# Patient Record
Sex: FEMALE | Race: WHITE | NOT HISPANIC OR LATINO | Employment: FULL TIME | URBAN - METROPOLITAN AREA
[De-identification: names, ages, dates, MRNs, and addresses within clinical notes are randomized per-mention and may not be internally consistent; named-entity substitution may affect disease eponyms.]

---

## 2019-07-10 ENCOUNTER — HOSPITAL ENCOUNTER (OUTPATIENT)
Dept: ULTRASOUND IMAGING | Facility: HOSPITAL | Age: 29
Discharge: HOME | End: 2019-07-10
Attending: OBSTETRICS & GYNECOLOGY

## 2020-01-17 ENCOUNTER — HOSPITAL ENCOUNTER (OUTPATIENT)
Dept: LABOR AND DELIVERY | Facility: HOSPITAL | Age: 30
Discharge: HOME | End: 2020-01-17
Attending: OBSTETRICS & GYNECOLOGY

## 2020-02-13 ENCOUNTER — HOSPITAL ENCOUNTER (OUTPATIENT)
Dept: LABOR AND DELIVERY | Facility: HOSPITAL | Age: 30
Discharge: HOME | End: 2020-02-13
Attending: OBSTETRICS & GYNECOLOGY

## 2020-05-22 ENCOUNTER — HOSPITAL ENCOUNTER (OUTPATIENT)
Dept: GENERAL RADIOLOGY | Facility: HOSPITAL | Age: 30
Discharge: HOME | End: 2020-05-22
Attending: NURSE PRACTITIONER

## 2020-05-22 ENCOUNTER — CONVERSION ENCOUNTER (OUTPATIENT)
Dept: FAMILY MEDICINE CLINIC | Facility: CLINIC | Age: 30
End: 2020-05-22

## 2020-05-22 ENCOUNTER — OFFICE VISIT CONVERTED (OUTPATIENT)
Dept: FAMILY MEDICINE CLINIC | Facility: CLINIC | Age: 30
End: 2020-05-22
Attending: NURSE PRACTITIONER

## 2021-05-10 NOTE — H&P
History and Physical      Patient Name: Gisselle White   Patient ID: [id number]   Sex: Female   YOB: 1990    Primary Care Provider: Mindy RIVERA    Visit Date: May 22, 2020    Provider: MIGUEL Thomas   Location: [facility]   Location Address: [address] [place] [address], [address] [place] [address]  [id number]   Location Phone: [phone]          Chief Complaint  · Est Care  · back pain since childbirth 2/16/20. Getting worse. Mid to low pain and consistent.  · wants IUD placed. Needs  referral to OB/GYN      History Of Present Illness  Gisselle White is a 29 year old female who presents for evaluation and treatment of: here to hospitals care. pt having lumbar spine pain which seems to be getting worse since having a baby in feb 2020. denies any radiating pain down the legs. She's had 4 pregnancies and does have no abdominal muscles she reports.       Past Medical History  Disease Name Date Onset Notes   Hemorrhoids --  --    Hernia --  --          Allergy List  Allergen Name Date Reaction Notes   NO KNOWN DRUG ALLERGIES --  --  --          Family Medical History  Disease Name Relative/Age Notes   Family history of breast cancer Mother/   --    Family history of heart disease  grandparents   Family history of diabetes mellitus Mother/   --          Social History  Finding Status Start/Stop Quantity Notes   Tobacco Never --/-- --  --          Review of Systems  · Constitutional  o Admits  o : she has 4 small children so she does feel tired. she does breast feed  o Denies  o : night sweats  · Eyes  o Denies  o : double vision, blurred vision  · HENT  o Denies  o : vertigo, recent head injury  · Breasts  o Denies  o : abnormal changes in breast size, additional breast symptoms except as noted in the HPI  · Cardiovascular  o Denies  o : chest pain, irregular heart beats  · Respiratory  o Denies  o : shortness of breath, productive cough  · Gastrointestinal  o Admits  o : pt  "says she's had abdominal muscle probs with all her pregnancies but this time it seems to be bothering her more. she does try turning self over instead of raising up. is careful about any exercises. She does not have bowel movements regularly but she does have them  o Denies  o : nausea, vomiting, diarrhea  · Genitourinary  o Admits  o : she wanted to be checked for any urinary problems due to her back pain   o Denies  o : dysuria, urinary retention  · Integument  o Denies  o : hair growth change, new skin lesions  · Neurologic  o Denies  o : altered mental status, seizures  · Musculoskeletal  o Admits  o : lumbar spine pain is constant dull ache. she had pain in upper back too but sometimes seems to go to the spine. she can feel pain when she is bending over to do things which makes it hard with having small children  o Denies  o : joint swelling  · Endocrine  o Denies  o : cold intolerance, heat intolerance  · Psychiatric  o Denies  o : anxiety, depression, difficulty sleeping, excessive anger  · Heme-Lymph  o Denies  o : petechiae, lymph node enlargement or tenderness  · Allergic-Immunologic  o Denies  o : frequent illnesses      Vitals  Date Time BP Position Site L\R Cuff Size HR RR TEMP (F) WT  HT  BMI kg/m2 BSA m2 O2 Sat HC       05/22/2020 10:36 /67 Sitting    69 - R 11 97.3 177lbs 9oz 5'  6\" 28.66 1.94 99 %          Physical Examination  · Constitutional  o Appearance  o : well-nourished, well developed, alert, in no acute distress  · Head and Face  o Face  o :   § Inspection  § : no facial lesions  § Palpation  § : no sinus tenderness on palpation  · Eyes  o Conjunctivae  o : conjunctivae normal  o Sclerae  o : sclerae white  o Pupils and Irises  o : pupils equal, round, and reactive to light and accommodation bilaterally  o Corneas  o : tear film normal, no lesions present  o Eyelids/Ocular Adnexae  o : eyelid appearance normal, no exudates present, eye moisture level normal  · Ears, Nose, Mouth and " Throat  o Ears  o : external ear auricle normal, otic canal normal, TM with no reddness, effusion, retraction  o Nose  o : external normal, nasal mucosa normal, turbinates normal  o Oral Cavity  o : tongue no lesion, oral mucosa normal  o Throat  o : no erythemia, exudate or lesions  · Neck  o Inspection/Palpation  o : normal appearance, no masses or tenderness, trachea midline, no enlarged cervical or supraclavicular lymphnodes palpated  o Thyroid  o : gland size normal, nontender, no nodules or masses present on palpation, thyroid motion normal during swallowing  · Respiratory  o Respiratory Effort  o : breathing unlabored  o Inspection of Chest  o : normal appearance, no retractions  o Auscultation of Lungs  o : normal breath sounds throughout  · Cardiovascular  o Heart  o :   § Auscultation of Heart  § : regular rate and rhythm without murmur  o Peripheral Vascular System  o :   § Carotid Arteries  § : normal pulses bilaterally, no bruits present  § Extremities  § : no edema, no cyanosis, no distal hair loss, normal capillary refill  · Gastrointestinal  o Abdominal Examination  o : abdomen nontender to palpation, hypoactive bowel sounds present, tone normal without rigidity or guarding, no masses present, abdomen scaphoid upon supine  · Musculoskeletal  o General  o :   § General Musculoskeletal  § : No joint swelling or deformity., Muscle tone, strength  o Spine  o :   § Inspection/Palpation  § : No palpable pain  · Skin and Subcutaneous Tissue  o General Inspection  o : no rashes or lesions present, no areas of discoloration  · Neurologic  o Mental Status Examination  o : judgement, insight intact, modd and affect appropriate  o Motor Examination  o : strength grossly intact in all four extremities  o Gait and Station  o : normal gait, able to stand without difficulty          Results  · In-Office Procedures  o Lab procedure  § IOP - Urinalysis without Microscopy (Clinitek) [facility] ([id number])   § Color Ur: Yellow    § Clarity Ur: Clear   § Glucose Ur Ql Strip: Negative   § Bilirub Ur Ql Strip: Negative   § Ketones Ur Ql Strip: Negative   § Sp Gr Ur Qn: greater than 1.030   § Hgb Ur Ql Strip: Negative   § pH Ur-LsCnc: 6.0   § Prot Ur Ql Strip: Negative   § Urobilinogen Ur Strip-mCnc: 0.2 E.U./dL   § Nitrite Ur Ql Strip: Negative   § WBC Est Ur Ql Strip: Negative       Assessment  · Screening for depression     V79.0/Z13.89  · Screening for lipid disorders     V77.91/Z13.220  · Fatigue     780.79/R53.83  · Vitamin D deficiency     268.9/E55.9  · Back pain     724.5/M54.9  · Screening for diabetes mellitus     V77.1/Z13.1  · Screening for cardiovascular condition     V81.2/Z13.6  · Screening for thyroid disorder     V77.0/Z13.29  · Birth control counseling     V25.09/Z30.09  · Diastasis recti     728.84/M62.08      Plan  · Orders  o Hgb A1c [facility] ([id number]) - V77.1/Z13.1 - 05/22/2020  o Physical, Primary Care Panel (CBC, CMP, Lipid, TSH) [facility] ([id number], [id number], [id number], [id number]) - V77.91/Z13.220, V81.2/Z13.6, V77.0/Z13.29, 780.79/R53.83 - 05/22/2020  o Vitamin D (25-Hydroxy) Level ([id number]) - 268.9/E55.9 - 05/22/2020  o ACO-39: Current medications updated and reviewed () - - 05/22/2020  o ACO-18: Negative screen for clinical depression using a standardized tool () - - 05/22/2020  o ACO-14: Influenza immunization was not administered for reasons documented () - - 05/22/2020   personal preference  o Xray thoracic spine 3 views [facility] ([id number]) - 724.5/M54.9 - 05/22/2020   mid to low back pain  o Lumbar Spine Complete [facility] ([id number]) - 724.5/M54.9 - 05/22/2020   mid to low back pain   has pain that starts in thoracic spine and then down into the lumbar spine. no radiation down the legs. has very weak abd muscles  o OB/GYN CONSULTATION (OBGYN) - V25.09/Z30.09 - 05/23/2020   wants IUD placed  o B12 Folate levels (B12FO) - 780.79/R53.83 - 05/22/2020  o General Surgery Consult (GNSUR) - 728.84/M62.08 - 05/22/2020    refer to Dr. Parnell. she's had this for awhile and since 4th pregnancy feels it is now causing her to have more lumbar spine pain  · Medications  o Medications have been Reconciled  o Transition of Care or Provider Policy  · Instructions  o Depression Screen completed and scanned into the EMR under the designated folder within the patient's documents.  o Today's PHQ-9 result is _3__  o Patient was educated/instructed on their diagnosis, treatment and medications prior to discharge from the clinic today.  o Flu vaccine declined.  · Disposition  o Follow up in 6 months            Electronically Signed by: Mindy Sylvester APRN -Author on May 22, 2020 01:10:05 PM

## 2021-05-15 VITALS
WEIGHT: 177.56 LBS | SYSTOLIC BLOOD PRESSURE: 120 MMHG | HEIGHT: 66 IN | RESPIRATION RATE: 11 BRPM | TEMPERATURE: 97.3 F | HEART RATE: 69 BPM | DIASTOLIC BLOOD PRESSURE: 67 MMHG | BODY MASS INDEX: 28.54 KG/M2 | OXYGEN SATURATION: 99 %

## 2021-07-22 ENCOUNTER — OFFICE VISIT (OUTPATIENT)
Dept: FAMILY MEDICINE CLINIC | Facility: CLINIC | Age: 31
End: 2021-07-22

## 2021-07-22 VITALS
WEIGHT: 137.2 LBS | DIASTOLIC BLOOD PRESSURE: 73 MMHG | RESPIRATION RATE: 16 BRPM | SYSTOLIC BLOOD PRESSURE: 117 MMHG | OXYGEN SATURATION: 100 % | BODY MASS INDEX: 22.05 KG/M2 | HEIGHT: 66 IN | HEART RATE: 67 BPM

## 2021-07-22 DIAGNOSIS — E55.9 VITAMIN D DEFICIENCY: ICD-10-CM

## 2021-07-22 DIAGNOSIS — R23.3 EASY BRUISING: ICD-10-CM

## 2021-07-22 DIAGNOSIS — R53.83 FATIGUE, UNSPECIFIED TYPE: ICD-10-CM

## 2021-07-22 DIAGNOSIS — Z13.220 SCREENING FOR LIPID DISORDERS: ICD-10-CM

## 2021-07-22 DIAGNOSIS — R19.09 BULGE IN GROIN AREA: Primary | ICD-10-CM

## 2021-07-22 PROCEDURE — 99203 OFFICE O/P NEW LOW 30 MIN: CPT | Performed by: NURSE PRACTITIONER

## 2021-07-22 NOTE — PROGRESS NOTES
"Chief Complaint  Groin Swelling    Subjective          Gisselle White presents to [facility] for   History of Present Illness    Patient is here to establish care. Patient's previous PCP was Mindy RIVERA.    Patient is here with complaints of bruising more frequently. States frequently bruising on her thighs (inner) with no explanation. denies any bleeding. States this has been going on for several weeks.      Patient is complaining of a swollen spot at the top of her pubic area on the right side that has been there for 2-3 months. Denies any pain or tenderness     Pt is s/p 4 pregnancies. Denies ever having had any issues with pelvic varicosities, states she did have an umbilical hernia during one pregnancy.     Patient is requesting lab work to be done. States she does have a lot of fatigue, but also has 4 kids, just wants to be sure that everything is ok. Hx vit d deficiency   Medical History  Past Medical History:   Diagnosis Date   • Condition not found     HERNIA   • Hemorrhoids      Surgical History  History reviewed. No pertinent surgical history.  Social History  Social History     Socioeconomic History   • Marital status:      Spouse name: Not on file   • Number of children: Not on file   • Years of education: Not on file   • Highest education level: Not on file   Tobacco Use   • Smoking status: Never Smoker   • Smokeless tobacco: Never Used   Vaping Use   • Vaping Use: Never used   Substance and Sexual Activity   • Alcohol use: Not Currently   • Drug use: Never   • Sexual activity: Defer     No current outpatient medications on file.    Review of Systems     Objective     /73 (BP Location: Left arm, Patient Position: Sitting, Cuff Size: Adult)   Pulse 67   Resp 16   Ht 167.6 cm (66\")   Wt 62.2 kg (137 lb 3.2 oz)   SpO2 100%   BMI 22.14 kg/m²     Body mass index is 22.14 kg/m².    Physical Exam  Vitals reviewed.   Constitutional:       " Appearance: Normal appearance. She is well-developed.   HENT:      Head: Normocephalic and atraumatic.      Right Ear: External ear normal.      Left Ear: External ear normal.      Mouth/Throat:      Pharynx: No oropharyngeal exudate.   Eyes:      Conjunctiva/sclera: Conjunctivae normal.      Pupils: Pupils are equal, round, and reactive to light.   Cardiovascular:      Rate and Rhythm: Normal rate and regular rhythm.      Heart sounds: No murmur heard.   No friction rub. No gallop.    Pulmonary:      Effort: Pulmonary effort is normal.      Breath sounds: Normal breath sounds. No wheezing or rhonchi.   Abdominal:      General: Bowel sounds are normal. There is no distension.      Palpations: Abdomen is soft.      Tenderness: There is no abdominal tenderness.      Comments: Right side groin with approximately 3 cm x 3 cm area of bulge noted, nontender, not appreciable when patient is lying flat.   Skin:     General: Skin is warm and dry.   Neurological:      Mental Status: She is alert and oriented to person, place, and time.      Cranial Nerves: No cranial nerve deficit.   Psychiatric:         Mood and Affect: Mood and affect normal.         Behavior: Behavior normal.         Thought Content: Thought content normal.         Judgment: Judgment normal.         Result Review :     The following data was reviewed by: MIGUEL Welsh on 07/22/2021:                         Assessment:  Diagnoses and all orders for this visit:    1. Bulge in groin area (Primary)  -     US Abdomen Limited; Future    2. Vitamin D deficiency  -     Vitamin D 25 Hydroxy    3. Screening for lipid disorders  -     Lipid Panel    4. Fatigue, unspecified type  -     CBC & Differential; Future  -     Comprehensive Metabolic Panel; Future  -     TSH; Future  -     T4, Free; Future  -     Vitamin B12; Future  -     Folate; Future  -     Iron Profile; Future  -     Ferritin; Future  -     APTT; Future    5. Easy bruising  -     APTT;  Future        Plan:   Labs as above.  Ultrasound to evaluate groin bulge          Follow Up     No follow-ups on file.    Patient was given instructions and counseling regarding her condition or for health maintenance advice. Please see specific information pulled into the AVS if appropriate.     Elaine Young, APRN  07/22/2021

## 2021-07-31 PROCEDURE — 87660 TRICHOMONAS VAGIN DIR PROBE: CPT | Performed by: FAMILY MEDICINE

## 2021-07-31 PROCEDURE — 87591 N.GONORRHOEAE DNA AMP PROB: CPT | Performed by: FAMILY MEDICINE

## 2021-07-31 PROCEDURE — 87491 CHLMYD TRACH DNA AMP PROBE: CPT | Performed by: FAMILY MEDICINE

## 2021-07-31 PROCEDURE — 87480 CANDIDA DNA DIR PROBE: CPT | Performed by: FAMILY MEDICINE

## 2021-07-31 PROCEDURE — 87077 CULTURE AEROBIC IDENTIFY: CPT | Performed by: FAMILY MEDICINE

## 2021-07-31 PROCEDURE — 87086 URINE CULTURE/COLONY COUNT: CPT | Performed by: FAMILY MEDICINE

## 2021-07-31 PROCEDURE — 87510 GARDNER VAG DNA DIR PROBE: CPT | Performed by: FAMILY MEDICINE

## 2021-07-31 PROCEDURE — 87186 SC STD MICRODIL/AGAR DIL: CPT | Performed by: FAMILY MEDICINE

## 2021-08-01 ENCOUNTER — TELEPHONE (OUTPATIENT)
Dept: URGENT CARE | Facility: CLINIC | Age: 31
End: 2021-08-01

## 2021-08-01 NOTE — TELEPHONE ENCOUNTER
----- Message from Linda Lindsey MD sent at 8/1/2021  2:25 PM EDT -----  Please call the patient regarding her negative vaginal screen and GC Chlamydia tests.  Let her know her urine culture is still pending for bacterial evaluation.

## 2021-08-02 ENCOUNTER — TELEPHONE (OUTPATIENT)
Dept: URGENT CARE | Facility: CLINIC | Age: 31
End: 2021-08-02

## 2021-08-02 NOTE — TELEPHONE ENCOUNTER
----- Message from MIGUEL Yañez sent at 8/2/2021  3:48 PM EDT -----  Please call the patient regarding her abnormal result.  Please let patient know that her urine culture grew E. coli which is one of the more common organisms we see with urinary tract infections.  It also shows that the medication that she was put on which is the nitrofurantoin should work completely clearing this you to urinary tract infection.  If the patient has any other concerns they may follow-up with her primary care provider.    Thank you,  MIGUEL Yañez

## 2021-08-10 ENCOUNTER — LAB (OUTPATIENT)
Dept: LAB | Facility: HOSPITAL | Age: 31
End: 2021-08-10

## 2021-08-10 ENCOUNTER — HOSPITAL ENCOUNTER (OUTPATIENT)
Dept: ULTRASOUND IMAGING | Facility: HOSPITAL | Age: 31
Discharge: HOME | End: 2021-08-10

## 2021-08-10 DIAGNOSIS — R19.09 BULGE IN GROIN AREA: ICD-10-CM

## 2021-08-10 DIAGNOSIS — R23.3 EASY BRUISING: ICD-10-CM

## 2021-08-10 DIAGNOSIS — R53.83 FATIGUE, UNSPECIFIED TYPE: ICD-10-CM

## 2021-08-10 LAB
25(OH)D3 SERPL-MCNC: 30.5 NG/ML (ref 30–100)
ALBUMIN SERPL-MCNC: 4.3 G/DL (ref 3.5–5.2)
ALBUMIN/GLOB SERPL: 1.8 G/DL
ALP SERPL-CCNC: 35 U/L (ref 39–117)
ALT SERPL W P-5'-P-CCNC: 9 U/L (ref 1–33)
ANION GAP SERPL CALCULATED.3IONS-SCNC: 9 MMOL/L (ref 5–15)
APTT PPP: 24.7 SECONDS (ref 22.2–34.2)
AST SERPL-CCNC: 14 U/L (ref 1–32)
BASOPHILS # BLD AUTO: 0.03 10*3/MM3 (ref 0–0.2)
BASOPHILS NFR BLD AUTO: 0.5 % (ref 0–1.5)
BILIRUB SERPL-MCNC: 0.3 MG/DL (ref 0–1.2)
BUN SERPL-MCNC: 11 MG/DL (ref 6–20)
BUN/CREAT SERPL: 14.7 (ref 7–25)
CALCIUM SPEC-SCNC: 8.9 MG/DL (ref 8.6–10.5)
CHLORIDE SERPL-SCNC: 108 MMOL/L (ref 98–107)
CHOLEST SERPL-MCNC: 160 MG/DL (ref 0–200)
CO2 SERPL-SCNC: 26 MMOL/L (ref 22–29)
CREAT SERPL-MCNC: 0.75 MG/DL (ref 0.57–1)
DEPRECATED RDW RBC AUTO: 41.7 FL (ref 37–54)
EOSINOPHIL # BLD AUTO: 0.21 10*3/MM3 (ref 0–0.4)
EOSINOPHIL NFR BLD AUTO: 3.4 % (ref 0.3–6.2)
ERYTHROCYTE [DISTWIDTH] IN BLOOD BY AUTOMATED COUNT: 12.8 % (ref 12.3–15.4)
FERRITIN SERPL-MCNC: 15.4 NG/ML (ref 13–150)
FOLATE SERPL-MCNC: 3.95 NG/ML (ref 4.78–24.2)
GFR SERPL CREATININE-BSD FRML MDRD: 91 ML/MIN/1.73
GLOBULIN UR ELPH-MCNC: 2.4 GM/DL
GLUCOSE SERPL-MCNC: 79 MG/DL (ref 65–99)
HCT VFR BLD AUTO: 40.5 % (ref 34–46.6)
HDLC SERPL-MCNC: 75 MG/DL (ref 40–60)
HGB BLD-MCNC: 13.1 G/DL (ref 12–15.9)
IMM GRANULOCYTES # BLD AUTO: 0.02 10*3/MM3 (ref 0–0.05)
IMM GRANULOCYTES NFR BLD AUTO: 0.3 % (ref 0–0.5)
LDLC SERPL CALC-MCNC: 78 MG/DL (ref 0–100)
LDLC/HDLC SERPL: 1.06 {RATIO}
LYMPHOCYTES # BLD AUTO: 2.2 10*3/MM3 (ref 0.7–3.1)
LYMPHOCYTES NFR BLD AUTO: 35.9 % (ref 19.6–45.3)
MCH RBC QN AUTO: 29.2 PG (ref 26.6–33)
MCHC RBC AUTO-ENTMCNC: 32.3 G/DL (ref 31.5–35.7)
MCV RBC AUTO: 90.2 FL (ref 79–97)
MONOCYTES # BLD AUTO: 0.34 10*3/MM3 (ref 0.1–0.9)
MONOCYTES NFR BLD AUTO: 5.6 % (ref 5–12)
NEUTROPHILS NFR BLD AUTO: 3.32 10*3/MM3 (ref 1.7–7)
NEUTROPHILS NFR BLD AUTO: 54.3 % (ref 42.7–76)
NRBC BLD AUTO-RTO: 0 /100 WBC (ref 0–0.2)
PLATELET # BLD AUTO: 225 10*3/MM3 (ref 140–450)
PMV BLD AUTO: 10.3 FL (ref 6–12)
POTASSIUM SERPL-SCNC: 3.9 MMOL/L (ref 3.5–5.2)
PROT SERPL-MCNC: 6.7 G/DL (ref 6–8.5)
RBC # BLD AUTO: 4.49 10*6/MM3 (ref 3.77–5.28)
SODIUM SERPL-SCNC: 143 MMOL/L (ref 136–145)
T4 FREE SERPL-MCNC: 1.12 NG/DL (ref 0.93–1.7)
TRIGL SERPL-MCNC: 27 MG/DL (ref 0–150)
TSH SERPL DL<=0.05 MIU/L-ACNC: 2.29 UIU/ML (ref 0.27–4.2)
VIT B12 BLD-MCNC: 378 PG/ML (ref 211–946)
VLDLC SERPL-MCNC: 7 MG/DL (ref 5–40)
WBC # BLD AUTO: 6.12 10*3/MM3 (ref 3.4–10.8)

## 2021-08-10 PROCEDURE — 84439 ASSAY OF FREE THYROXINE: CPT

## 2021-08-10 PROCEDURE — 36415 COLL VENOUS BLD VENIPUNCTURE: CPT

## 2021-08-10 PROCEDURE — 85730 THROMBOPLASTIN TIME PARTIAL: CPT

## 2021-08-10 PROCEDURE — 80061 LIPID PANEL: CPT | Performed by: NURSE PRACTITIONER

## 2021-08-10 PROCEDURE — 84443 ASSAY THYROID STIM HORMONE: CPT

## 2021-08-10 PROCEDURE — 80053 COMPREHEN METABOLIC PANEL: CPT

## 2021-08-10 PROCEDURE — 82746 ASSAY OF FOLIC ACID SERUM: CPT

## 2021-08-10 PROCEDURE — 83540 ASSAY OF IRON: CPT

## 2021-08-10 PROCEDURE — 82728 ASSAY OF FERRITIN: CPT

## 2021-08-10 PROCEDURE — 85025 COMPLETE CBC W/AUTO DIFF WBC: CPT

## 2021-08-10 PROCEDURE — 84466 ASSAY OF TRANSFERRIN: CPT

## 2021-08-10 PROCEDURE — 82306 VITAMIN D 25 HYDROXY: CPT | Performed by: NURSE PRACTITIONER

## 2021-08-10 PROCEDURE — 76999 ECHO EXAMINATION PROCEDURE: CPT

## 2021-08-10 PROCEDURE — 82607 VITAMIN B-12: CPT

## 2021-08-11 ENCOUNTER — TELEPHONE (OUTPATIENT)
Dept: FAMILY MEDICINE CLINIC | Facility: CLINIC | Age: 31
End: 2021-08-11

## 2021-08-11 LAB
IRON 24H UR-MRATE: 37 MCG/DL (ref 37–145)
IRON SATN MFR SERPL: 8 % (ref 20–50)
TIBC SERPL-MCNC: 446 MCG/DL (ref 298–536)
TRANSFERRIN SERPL-MCNC: 299 MG/DL (ref 200–360)

## 2021-08-12 ENCOUNTER — TELEPHONE (OUTPATIENT)
Dept: FAMILY MEDICINE CLINIC | Facility: CLINIC | Age: 31
End: 2021-08-12

## 2021-08-18 ENCOUNTER — TELEPHONE (OUTPATIENT)
Dept: FAMILY MEDICINE CLINIC | Facility: CLINIC | Age: 31
End: 2021-08-18

## 2021-08-18 DIAGNOSIS — K40.90 NON-RECURRENT INGUINAL HERNIA WITHOUT OBSTRUCTION OR GANGRENE, UNSPECIFIED LATERALITY: Primary | ICD-10-CM

## 2021-08-24 ENCOUNTER — TELEPHONE (OUTPATIENT)
Dept: FAMILY MEDICINE CLINIC | Facility: CLINIC | Age: 31
End: 2021-08-24

## 2021-08-24 NOTE — TELEPHONE ENCOUNTER
helen     Caller: Gisselle White    Relationship: Self    Best call back number: [phone]      What was the call regarding: PATIENT WAS RETURNING CALL TO [facility]EN SAID SHE HAD LEFT MESSAGE. WAS TRANSFERRED TO NURSE LINE TO LEAVE MESSAGE. WANTED TO MAKE SURE SHE WAS AWARE OF CALL. PLEASE ADVISE     Do you require a callback: YES

## 2021-08-24 NOTE — TELEPHONE ENCOUNTER
Caller: Gisselle White    Relationship: Self    Best call back number: [phone]    What is the best time to reach you: ANYTIME     Who are you requesting to speak with (clinical staff, provider,  specific staff member): SINDI ALAS     What was the call regarding: PATIENT WOULD LIKE TO GO AHEAD AND GET SOME BIRTH CONTROL.  HAD TO RESCHEDULE DUE TO SICK CHILDREN TODAY, HAS RESCHEDULED FOR 9/8.    PLEASE CALL PATIENT AND LET HER KNOW WHAT TO EXPECT.     Do you require a callback: YES

## 2021-08-24 NOTE — TELEPHONE ENCOUNTER
Birth control cannot be started without an appointment for discussion and a pregnancy test.  Patient may schedule an earlier appointment or wait until current appointment

## 2021-08-26 ENCOUNTER — HOSPITAL ENCOUNTER (EMERGENCY)
Facility: HOSPITAL | Age: 31
Discharge: HOME | End: 2021-08-26
Attending: EMERGENCY MEDICINE | Admitting: EMERGENCY MEDICINE

## 2021-08-26 ENCOUNTER — APPOINTMENT (OUTPATIENT)
Dept: GENERAL RADIOLOGY | Facility: HOSPITAL | Age: 31
End: 2021-08-26

## 2021-08-26 VITALS
HEIGHT: 66 IN | WEIGHT: 131.17 LBS | SYSTOLIC BLOOD PRESSURE: 113 MMHG | DIASTOLIC BLOOD PRESSURE: 76 MMHG | RESPIRATION RATE: 20 BRPM | TEMPERATURE: 99.2 F | HEART RATE: 93 BPM | OXYGEN SATURATION: 100 % | BODY MASS INDEX: 21.08 KG/M2

## 2021-08-26 DIAGNOSIS — J18.9 PNEUMONIA OF RIGHT LOWER LOBE DUE TO INFECTIOUS ORGANISM: Primary | ICD-10-CM

## 2021-08-26 DIAGNOSIS — U07.1 COVID-19 VIRUS INFECTION: ICD-10-CM

## 2021-08-26 PROCEDURE — 71045 X-RAY EXAM CHEST 1 VIEW: CPT

## 2021-08-26 PROCEDURE — U0003 INFECTIOUS AGENT DETECTION BY NUCLEIC ACID (DNA OR RNA); SEVERE ACUTE RESPIRATORY SYNDROME CORONAVIRUS 2 (SARS-COV-2) (CORONAVIRUS DISEASE [COVID-19]), AMPLIFIED PROBE TECHNIQUE, MAKING USE OF HIGH THROUGHPUT TECHNOLOGIES AS DESCRIBED BY CMS-2020-01-R: HCPCS | Performed by: EMERGENCY MEDICINE

## 2021-08-26 PROCEDURE — 99283 EMERGENCY DEPT VISIT LOW MDM: CPT

## 2021-08-26 RX ORDER — FLUCONAZOLE 150 MG/1
150 TABLET ORAL ONCE
Qty: 1 TABLET | Refills: 0 | Status: SHIPPED | OUTPATIENT
Start: 2021-08-26 | End: 2021-08-26

## 2021-08-26 RX ORDER — ALBUTEROL SULFATE 90 UG/1
2 AEROSOL, METERED RESPIRATORY (INHALATION) EVERY 4 HOURS PRN
Qty: 18 G | Refills: 0 | Status: SHIPPED | OUTPATIENT
Start: 2021-08-26 | End: 2022-03-23

## 2021-08-26 RX ORDER — DEXAMETHASONE 4 MG/1
4 TABLET ORAL 2 TIMES DAILY WITH MEALS
Qty: 10 TABLET | Refills: 0 | Status: SHIPPED | OUTPATIENT
Start: 2021-08-26 | End: 2021-08-31

## 2021-08-26 RX ORDER — DOXYCYCLINE 100 MG/1
100 CAPSULE ORAL 2 TIMES DAILY
Qty: 14 CAPSULE | Refills: 0 | Status: SHIPPED | OUTPATIENT
Start: 2021-08-26 | End: 2021-09-02

## 2021-08-26 RX ORDER — BROMPHENIRAMINE MALEATE, PSEUDOEPHEDRINE HYDROCHLORIDE, AND DEXTROMETHORPHAN HYDROBROMIDE 2; 30; 10 MG/5ML; MG/5ML; MG/5ML
10 SYRUP ORAL 4 TIMES DAILY PRN
Qty: 120 ML | Refills: 0 | Status: SHIPPED | OUTPATIENT
Start: 2021-08-26 | End: 2022-03-23

## 2021-08-26 RX ORDER — AZITHROMYCIN 250 MG/1
TABLET, FILM COATED ORAL
Qty: 6 TABLET | Refills: 0 | Status: SHIPPED | OUTPATIENT
Start: 2021-08-26 | End: 2022-03-23

## 2021-08-27 LAB — SARS-COV-2 RNA RESP QL NAA+PROBE: DETECTED

## 2021-09-08 ENCOUNTER — OFFICE VISIT (OUTPATIENT)
Dept: FAMILY MEDICINE CLINIC | Facility: CLINIC | Age: 31
End: 2021-09-08

## 2021-09-08 VITALS
WEIGHT: 136.2 LBS | OXYGEN SATURATION: 100 % | BODY MASS INDEX: 21.89 KG/M2 | HEIGHT: 66 IN | HEART RATE: 103 BPM | DIASTOLIC BLOOD PRESSURE: 69 MMHG | SYSTOLIC BLOOD PRESSURE: 103 MMHG

## 2021-09-08 DIAGNOSIS — Z30.9 ENCOUNTER FOR CONTRACEPTIVE MANAGEMENT, UNSPECIFIED TYPE: Primary | ICD-10-CM

## 2021-09-08 DIAGNOSIS — Z30.09 FAMILY PLANNING: ICD-10-CM

## 2021-09-08 LAB
B-HCG UR QL: NEGATIVE
INTERNAL NEGATIVE CONTROL: NORMAL
INTERNAL POSITIVE CONTROL: NORMAL
Lab: NORMAL

## 2021-09-08 PROCEDURE — 99213 OFFICE O/P EST LOW 20 MIN: CPT | Performed by: NURSE PRACTITIONER

## 2021-09-08 PROCEDURE — 81025 URINE PREGNANCY TEST: CPT | Performed by: NURSE PRACTITIONER

## 2021-09-08 NOTE — PROGRESS NOTES
Chief Complaint  Contraception    Subjective          Gisselle White presents to [facility] for   History of Present Illness    Patient is here for contraception management. Patient has been on birth control in the past and is wanting to go back on it.  Patient states she would actually prefer a nonhormonal form of contraceptive such as the ParaGard copper IUD.  Patient states that she has 4 children and is 100% sure that she does not want any further children.  Patient states that she does want to get on the patch if possible today for protection until her appointment for the ParaGard.  Patient is currently sexually active.  Patient does not smoke, has no family history of and has never personally had a blood clot.  Medical History  Past Medical History:   Diagnosis Date   • Condition not found     HERNIA   • Hemorrhoids      Surgical History  History reviewed. No pertinent surgical history.  Social History  Social History     Socioeconomic History   • Marital status:      Spouse name: Not on file   • Number of children: Not on file   • Years of education: Not on file   • Highest education level: Not on file   Tobacco Use   • Smoking status: Never Smoker   • Smokeless tobacco: Never Used   Vaping Use   • Vaping Use: Never used   Substance and Sexual Activity   • Alcohol use: Not Currently   • Drug use: Never   • Sexual activity: Defer       Current Outpatient Medications:   •  albuterol sulfate  (90 Base) MCG/ACT inhaler, Inhale 2 puffs Every 4 (Four) Hours As Needed for Wheezing., Disp: 18 g, Rfl: 0  •  azithromycin (ZITHROMAX) 250 MG tablet, Take 2 tablets today, then take 1 tablet daily for 4 days, Disp: 6 tablet, Rfl: 0  •  brompheniramine-pseudoephedrine-DM 30-2-10 MG/5ML syrup, Take 10 mL by mouth 4 (Four) Times a Day As Needed for Allergies., Disp: 120 mL, Rfl: 0  •  norelgestromin-ethinyl estradiol (ORTHO EVRA) 150-35 MCG/24HR, Place 1 patch on the  "skin as directed by provider 1 (One) Time Per Week., Disp: 3 patch, Rfl: 2    Review of Systems     Objective     /69 (BP Location: Left arm, Patient Position: Sitting, Cuff Size: Adult)   Pulse 103   Ht 167.6 cm (66\")   Wt 61.8 kg (136 lb 3.2 oz)   SpO2 100%   BMI 21.98 kg/m²     Body mass index is 21.98 kg/m².    Physical Exam  Vitals reviewed.   Constitutional:       Appearance: Normal appearance. She is well-developed.   HENT:      Head: Normocephalic and atraumatic.      Right Ear: External ear normal.      Left Ear: External ear normal.   Eyes:      Conjunctiva/sclera: Conjunctivae normal.      Pupils: Pupils are equal, round, and reactive to light.   Cardiovascular:      Rate and Rhythm: Normal rate and regular rhythm.      Heart sounds: No murmur heard.   No friction rub. No gallop.    Pulmonary:      Effort: Pulmonary effort is normal.      Breath sounds: Normal breath sounds. No wheezing or rhonchi.   Skin:     General: Skin is warm and dry.   Neurological:      Mental Status: She is alert and oriented to person, place, and time.      Cranial Nerves: No cranial nerve deficit.   Psychiatric:         Mood and Affect: Mood and affect normal.         Behavior: Behavior normal.         Thought Content: Thought content normal.         Judgment: Judgment normal.         Result Review :     The following data was reviewed by: MIGUEL Welsh on 09/08/2021:                         Assessment:  Diagnoses and all orders for this visit:    1. Encounter for contraceptive management, unspecified type (Primary)  -     Ambulatory Referral to Obstetrics / Gynecology  -     POCT pregnancy, urine    2. Family planning    Other orders  -     norelgestromin-ethinyl estradiol (ORTHO EVRA) 150-35 MCG/24HR; Place 1 patch on the skin as directed by provider 1 (One) Time Per Week.  Dispense: 3 patch; Refill: 2        Plan:   Side effects and administration of medication discussed at length.  Urine hCG in office " negative.  Discussed with patient that birth control would not be at full effectiveness until she has completed 1 month of treatment.  Recommend backup method of birth control such as condoms until that time.  We will refer patient on to GYN for further evaluation of possible ParaGard          Follow Up     No follow-ups on file.    Patient was given instructions and counseling regarding her condition or for health maintenance advice. Please see specific information pulled into the AVS if appropriate.     Elaine Young, MIGUEL  09/08/2021

## 2021-09-20 ENCOUNTER — OFFICE VISIT (OUTPATIENT)
Dept: OBSTETRICS AND GYNECOLOGY | Facility: CLINIC | Age: 31
End: 2021-09-20

## 2021-09-20 VITALS
SYSTOLIC BLOOD PRESSURE: 116 MMHG | DIASTOLIC BLOOD PRESSURE: 81 MMHG | HEART RATE: 74 BPM | BODY MASS INDEX: 21.98 KG/M2 | HEIGHT: 66 IN

## 2021-09-20 DIAGNOSIS — B37.9 CANDIDIASIS: ICD-10-CM

## 2021-09-20 DIAGNOSIS — Z11.3 ROUTINE SCREENING FOR STI (SEXUALLY TRANSMITTED INFECTION): ICD-10-CM

## 2021-09-20 DIAGNOSIS — Z30.014 ENCOUNTER FOR INITIAL PRESCRIPTION OF INTRAUTERINE CONTRACEPTIVE DEVICE (IUD): ICD-10-CM

## 2021-09-20 DIAGNOSIS — N89.8 VAGINAL ITCHING: Primary | ICD-10-CM

## 2021-09-20 DIAGNOSIS — Z01.419 ENCOUNTER FOR ANNUAL ROUTINE GYNECOLOGICAL EXAMINATION: ICD-10-CM

## 2021-09-20 LAB
C TRACH RRNA CVX QL NAA+PROBE: NOT DETECTED
CANDIDA SPECIES: POSITIVE
GARDNERELLA VAGINALIS: POSITIVE
N GONORRHOEA RRNA SPEC QL NAA+PROBE: NOT DETECTED
T VAGINALIS DNA VAG QL PROBE+SIG AMP: NEGATIVE

## 2021-09-20 PROCEDURE — 99385 PREV VISIT NEW AGE 18-39: CPT | Performed by: NURSE PRACTITIONER

## 2021-09-20 PROCEDURE — 87591 N.GONORRHOEAE DNA AMP PROB: CPT | Performed by: NURSE PRACTITIONER

## 2021-09-20 PROCEDURE — 87660 TRICHOMONAS VAGIN DIR PROBE: CPT | Performed by: NURSE PRACTITIONER

## 2021-09-20 PROCEDURE — 87491 CHLMYD TRACH DNA AMP PROBE: CPT | Performed by: NURSE PRACTITIONER

## 2021-09-20 PROCEDURE — 88175 CYTOPATH C/V AUTO FLUID REDO: CPT | Performed by: NURSE PRACTITIONER

## 2021-09-20 PROCEDURE — 87480 CANDIDA DNA DIR PROBE: CPT | Performed by: NURSE PRACTITIONER

## 2021-09-20 PROCEDURE — 87510 GARDNER VAG DNA DIR PROBE: CPT | Performed by: NURSE PRACTITIONER

## 2021-09-20 RX ORDER — FLUCONAZOLE 150 MG/1
150 TABLET ORAL DAILY
Qty: 1 TABLET | Refills: 0 | Status: SHIPPED | OUTPATIENT
Start: 2021-09-20 | End: 2022-03-23

## 2021-09-20 NOTE — PROGRESS NOTES
GYN Annual Exam     CC - Here for annual exam. Interested in Copper IUD.        HPI  Gisselle White is a 31 y.o. female, , who presents for annual well woman exam. Patient's last menstrual period was 2021..  Periods are regular every 25-35 days, lasting 5 days. .  Dysmenorrhea:none.  Patient reports problems with: vaginal itching.  There were no changes to her medical or surgical history since her last visit.. Partner Status: Marital Status: .  She is sexually active. She has had new partners since her last STD testing. She does desire STD testing. .    Additional OB/GYN History   Current contraception: contraceptive methods: Condoms  Desires to: start contraception  Last Pap :   Last Completed Pap Smear     This patient has no relevant Health Maintenance data.        History of abnormal Pap smear: no  Family history of uterine, colon, breast, or ovarian cancer: yes - breast CA, mother  Exercises Regularly:no  Feelings of Anxiety or Depression: no  Tobacco Usage?: No   OB History        4    Para   4    Term   4            AB        Living   4       SAB        TAB        Ectopic        Molar        Multiple        Live Births   4                Health Maintenance   Topic Date Due   • Annual Gynecologic Pelvic and Breast Exam  Never done   • ANNUAL PHYSICAL  Never done   • COVID-19 Vaccine (1) Never done   • PAP SMEAR  Never done   • INFLUENZA VACCINE  10/01/2021   • TDAP/TD VACCINES (2 - Td or Tdap) 2030   • HEPATITIS C SCREENING  Completed   • Pneumococcal Vaccine 0-64  Aged Out       The additional following portions of the patient's history were reviewed and updated as appropriate: allergies, current medications, past family history, past medical history, past social history, past surgical history and problem list.    Review of Systems   Constitutional: Negative.    HENT: Negative.    Eyes: Negative.    Respiratory: Negative for cough and shortness of breath.   "  Cardiovascular: Negative for chest pain and leg swelling.   Gastrointestinal: Negative for abdominal pain.   Endocrine: Negative.    Genitourinary: Positive for vaginal discharge. Negative for breast discharge, breast lump, breast pain, difficulty urinating, dyspareunia, dysuria, menstrual problem and pelvic pain.   Musculoskeletal: Negative.    Skin: Negative.    Allergic/Immunologic:        No new allergies.   Neurological: Negative.    Hematological: Negative.    Psychiatric/Behavioral: Negative for depressed mood. The patient is not nervous/anxious.          I have reviewed and agree with the HPI, ROS, and historical information as entered above. Kadeem Lo, APRN    Objective   /81 (BP Location: Right arm, Patient Position: Sitting, Cuff Size: Small Adult)   Pulse 74   Ht 167.6 cm (66\")   LMP 08/26/2021 Comment: staets she has been having heavy irregular periods ( this is the first time- she did take a plan B last week )   Breastfeeding No   BMI 21.98 kg/m²     Physical Exam  Vitals and nursing note reviewed. Exam conducted with a chaperone present.   Constitutional:       Appearance: Normal appearance. She is well-developed and well-groomed.   HENT:      Head: Normocephalic.   Eyes:      Pupils: Pupils are equal, round, and reactive to light.   Neck:      Thyroid: No thyroid mass or thyromegaly.   Cardiovascular:      Rate and Rhythm: Normal rate and regular rhythm.      Heart sounds: Normal heart sounds.   Pulmonary:      Effort: Pulmonary effort is normal.      Breath sounds: Normal breath sounds.   Chest:      Breasts:         Right: Normal. No inverted nipple, mass, nipple discharge or skin change.         Left: Normal. No inverted nipple, mass, nipple discharge or skin change.   Abdominal:      General: Abdomen is flat. Bowel sounds are normal.      Palpations: Abdomen is soft.      Tenderness: There is no abdominal tenderness.   Genitourinary:     General: Normal vulva.      Exam " position: Lithotomy position.      Pubic Area: No rash or pubic lice.       Vagina: Vaginal discharge present.      Cervix: Normal.      Uterus: Normal.       Adnexa: Right adnexa normal and left adnexa normal.        Right: No mass, tenderness or fullness.          Left: No mass, tenderness or fullness.        Rectum: Normal.         Musculoskeletal:      Cervical back: Normal range of motion and neck supple.   Skin:     General: Skin is warm and dry.   Neurological:      General: No focal deficit present.      Mental Status: She is alert.   Psychiatric:         Attention and Perception: Attention and perception normal.         Mood and Affect: Mood and affect normal.         Speech: Speech normal.         Behavior: Behavior normal. Behavior is cooperative.         Cognition and Memory: Cognition normal.         Judgment: Judgment normal.            Assessment and Plan    Problem List Items Addressed This Visit        Other    Yeast infection - Primary    Relevant Medications    fluconazole (Diflucan) 150 MG tablet      Other Visit Diagnoses     Encounter for annual routine gynecological examination        Relevant Orders    IGP,rfx Aptima HPV All Pth    Encounter for initial prescription of intrauterine contraceptive device (IUD)        Routine screening for STI (sexually transmitted infection)        Candidiasis        Relevant Medications    fluconazole (Diflucan) 150 MG tablet          1. GYN annual well woman exam.   2. Encouraged use of condoms for STD prevention.  3. Reviewed monthly self breast exams.  Instructed to call with lumps, pain, or breast discharge.    4. Reviewed exercise as a preventative health measures.   5. Reccommended Flu Vaccine in Fall of each year.  6. Other: 1-2 weeks for IUD insertion      Kadeem Lo, APRN  09/20/2021

## 2021-09-20 NOTE — PATIENT INSTRUCTIONS
Intrauterine Device Information  An intrauterine device (IUD) is a medical device that is inserted into the uterus to prevent pregnancy. It is a small, T-shaped device that has one or two nylon strings hanging down from it. The strings hang out of the lower part of the uterus (cervix) to allow for future IUD removal. There are two types of IUDs:  · Hormone IUD. This type of IUD is made of plastic and contains the hormone progestin (synthetic progesterone). A hormone IUD may last 3-5 years.  · Copper IUD. This type of IUD has copper wire wrapped around it. A copper IUD may last up to 10 years.  How is an IUD inserted?  An IUD is inserted through the vagina, through the cervix, and into the uterus with a minor medical procedure. The procedure for IUD insertion may vary among health care providers and hospitals.  How does an IUD work?  Synthetic progesterone in a hormonal IUD prevents pregnancy by:  · Thickening cervical mucus to prevent sperm from entering the uterus.  · Thinning the uterine lining to prevent a fertilized egg from being implanted there.  Copper in a copper IUD prevents pregnancy by making the uterus and fallopian tubes produce a fluid that kills sperm.  What are the advantages of an IUD?  Advantages of either type of IUD  An IUD:  · Is highly effective in preventing pregnancy.  · Is reversible. You can become pregnant shortly after the IUD is removed.  · Is low-maintenance and can stay in place for a long time.  · Has no estrogen-related side effects.  · Can be used when breastfeeding.  · Is not associated with weight gain.  · Can be inserted right after childbirth, an , or a miscarriage.  Advantages of a hormone IUD  · If it is inserted within 7 days of your period starting, it works right after it has been inserted. If the hormone IUD is inserted at any other time in your cycle, you will need to use a backup method of birth control for 7 days after insertion.  · It can make menstrual periods  lighter or stop completely.  · It can reduce menstrual cramping and other discomforts from menstrual periods.  · It can be used for 3-5 years, depending on which IUD you have.  Advantages of a copper IUD  · It works right after it is inserted.  · It can be used as a form of emergency birth control if it is inserted within 5 days after having unprotected sex.  · It does not interfere with your body's natural hormones.  · It can be used for up to 10 years.  What are the disadvantages of an IUD?  · An IUD may cause irregular menstrual bleeding for a period of time after insertion.  · It is common to have pain during insertion and have cramping and vaginal bleeding after insertion.  · An IUD may cut the uterus (uterine perforation) when it is inserted. This is rare.  · Pelvic inflammatory disease (PID) may happen after insertion of an IUD. PID is an infection in the uterus and fallopian tubes. The IUD does not cause the infection. The infection is usually from an unknown sexually transmitted infection (STI). This is rare, and it usually happens during the first 20 days after the IUD is inserted.  · A copper IUD can make your menstrual flow heavier and more painful.  · IUDs cannot prevent sexually transmitted infections (STIs).  How is an IUD removed?   · You will lie on your back with your knees bent and your feet in footrests (stirrups).  · A device will be inserted into your vagina to spread apart the vaginal walls (speculum). This will allow your health care provider to see the strings attached to the IUD.  · Your health care provider will use a small instrument (forceps) to grasp the IUD strings and will pull firmly until the IUD is removed.  You may have some discomfort when the IUD is removed. Your health care provider may recommend taking over-the-counter pain relievers, such as ibuprofen, before the procedure. You may also have minor spotting for a few days after the procedure.  The procedure for IUD removal may  vary among health care providers and hospitals.  Is an IUD right for me?  If you are interested in an IUD, discuss it with your health care provider. He or she will make sure you are a good candidate for an IUD and will let you know more about the advantages, disadvantage, and possible side effects. This will allow you to make a decision about the device.  Summary  · An intrauterine device (IUD) is a medical device that is inserted in the uterus to prevent pregnancy. It is a small, T-shaped device that has one or two nylon strings hanging down from it.  · A hormone IUD contains the hormone progestin (synthetic progesterone). A copper IUD has copper wire wrapped around it.  · Synthetic progesterone in a hormone IUD prevents pregnancy by thickening cervical mucus and thinning the walls of the uterus. Copper in a copper IUD prevents pregnancy by making the uterus and fallopian tubes produce a fluid that kills sperm.  · A hormone IUD can be left in place for 3-5 years. A copper IUD can be left in place for up to 10 years.  · An IUD is inserted and removed by a health care provider. You may feel some pain during insertion and removal. Your health care provider may recommend taking over-the-counter pain medicine, such as ibuprofen, before an IUD procedure.  This information is not intended to replace advice given to you by your health care provider. Make sure you discuss any questions you have with your health care provider.  Document Revised: 06/30/2021 Document Reviewed: 06/30/2021  Elsevier Patient Education © 2021 [employer]

## 2021-09-21 DIAGNOSIS — N76.0 ACUTE VAGINITIS: Primary | ICD-10-CM

## 2021-09-21 RX ORDER — METRONIDAZOLE 500 MG/1
500 TABLET ORAL 2 TIMES DAILY
Qty: 14 TABLET | Refills: 0 | Status: SHIPPED | OUTPATIENT
Start: 2021-09-21 | End: 2021-09-28

## 2021-09-22 LAB
CONV .: NORMAL
CYTOLOGIST CVX/VAG CYTO: NORMAL
CYTOLOGY CVX/VAG DOC CYTO: NORMAL
CYTOLOGY CVX/VAG DOC THIN PREP: NORMAL
DX ICD CODE: NORMAL
HIV 1 & 2 AB SER-IMP: NORMAL
OTHER STN SPEC: NORMAL
STAT OF ADQ CVX/VAG CYTO-IMP: NORMAL

## 2021-09-24 ENCOUNTER — TELEPHONE (OUTPATIENT)
Dept: FAMILY MEDICINE CLINIC | Facility: CLINIC | Age: 31
End: 2021-09-24

## 2021-09-24 ENCOUNTER — TRANSCRIBE ORDERS (OUTPATIENT)
Dept: ADMINISTRATIVE | Facility: HOSPITAL | Age: 31
End: 2021-09-24

## 2021-09-24 DIAGNOSIS — Z12.31 VISIT FOR SCREENING MAMMOGRAM: Primary | ICD-10-CM

## 2021-09-24 NOTE — TELEPHONE ENCOUNTER
Caller: Gisselle White    Relationship: Self    Best call back number: [phone]    What is the medical concern/diagnosis:    What specialty or service is being requested: MAMMOGRAM  What is the provider, practice or medical service name: [facility]  What is the office location:[address] [place] [address]  What is the office phone number: [phone]    Any additional details:PATIENT IS SCHEDULED FOR A MAMMOGRAM ON 10- AND NEEDS THE REFERRAL BEFORE THEN.

## 2021-10-01 ENCOUNTER — HOSPITAL ENCOUNTER (OUTPATIENT)
Dept: MAMMOGRAPHY | Facility: HOSPITAL | Age: 31
Discharge: HOME | End: 2021-10-01
Admitting: NURSE PRACTITIONER

## 2021-10-01 DIAGNOSIS — Z12.31 VISIT FOR SCREENING MAMMOGRAM: ICD-10-CM

## 2021-10-01 PROCEDURE — 77067 SCR MAMMO BI INCL CAD: CPT

## 2021-10-01 NOTE — TELEPHONE ENCOUNTER
I dont see where this was ordered in a visit or encounter, it looks like it was transcribed by paper, so not sure who ordered it. She does have FMHX of breast cancer (mom) but not sure who gave the okay to order this.

## 2021-10-05 ENCOUNTER — OFFICE VISIT (OUTPATIENT)
Dept: OBSTETRICS AND GYNECOLOGY | Facility: CLINIC | Age: 31
End: 2021-10-05

## 2021-10-05 VITALS
DIASTOLIC BLOOD PRESSURE: 70 MMHG | HEIGHT: 66 IN | WEIGHT: 141.8 LBS | SYSTOLIC BLOOD PRESSURE: 117 MMHG | BODY MASS INDEX: 22.79 KG/M2 | HEART RATE: 64 BPM

## 2021-10-05 DIAGNOSIS — Z53.8 UNSUCCESSFUL ATTEMPT TO INSERT INTRAUTERINE DEVICE (IUD): Primary | ICD-10-CM

## 2021-10-05 LAB
B-HCG UR QL: NEGATIVE
INTERNAL NEGATIVE CONTROL: NEGATIVE
INTERNAL POSITIVE CONTROL: NORMAL
Lab: NORMAL

## 2021-10-05 PROCEDURE — 81025 URINE PREGNANCY TEST: CPT | Performed by: NURSE PRACTITIONER

## 2021-10-05 PROCEDURE — 58300 INSERT INTRAUTERINE DEVICE: CPT | Performed by: NURSE PRACTITIONER

## 2021-10-05 RX ORDER — COPPER 313.4 MG/1
INTRAUTERINE DEVICE INTRAUTERINE ONCE
Status: COMPLETED | OUTPATIENT
Start: 2021-10-05 | End: 2021-10-08

## 2021-10-05 NOTE — PROGRESS NOTES
Procedure: IUD Insertion Procedure Note     Procedures    Pre procedure indication 1) Desires ParaGard  Post procedure indication 1) Desires ParaGard    The risks, benefits, and alternatives to ParaGard were explained at length with the patient. All her questions were answered and consents were signed.    The patient was placed in a dorsal lithotomy position on the examining table in [facility]. A bimanual exam confirmed the uterus was normal in size, midline. A speculum was inserted into the vagina and the cervix was brought into view.    The cervix was prepped with Betadine. The anterior lip was grasped with a single-tooth tenaculum.  Multiple attempts to sound the endometrial cavity were performed, including gentle use of a manual dilator times two.  Discussed with patient her cervix is not currently open and need to wait until she is on her cycle to attempt insertion.  Discussed our practice does not use any cervical ripening medications for IUD placement.   Patient verbalizes understanding and agrees to wait.   Tenaculum removed from cervix and all instruments removed from vagina.    Patient to call office for same or next day appointment when she starts her cycle.         Kadeem Lo, APRN  10/05/2021

## 2021-10-07 NOTE — TELEPHONE ENCOUNTER
I have looked and saw that your name is signed on the order. Someone from [facility], I'm assuming, put in the order without a signature. What do we need to do?

## 2021-10-08 ENCOUNTER — OFFICE VISIT (OUTPATIENT)
Dept: OBSTETRICS AND GYNECOLOGY | Facility: CLINIC | Age: 31
End: 2021-10-08

## 2021-10-08 VITALS
HEART RATE: 65 BPM | BODY MASS INDEX: 22.66 KG/M2 | SYSTOLIC BLOOD PRESSURE: 121 MMHG | HEIGHT: 66 IN | WEIGHT: 141 LBS | DIASTOLIC BLOOD PRESSURE: 78 MMHG

## 2021-10-08 DIAGNOSIS — Z30.430 ENCOUNTER FOR INSERTION OF PARAGARD IUD: Primary | ICD-10-CM

## 2021-10-08 PROCEDURE — 58300 INSERT INTRAUTERINE DEVICE: CPT | Performed by: NURSE PRACTITIONER

## 2021-10-08 RX ORDER — COPPER 313.4 MG/1
INTRAUTERINE DEVICE INTRAUTERINE ONCE
Status: SHIPPED | OUTPATIENT
Start: 2021-10-08

## 2021-10-08 RX ORDER — IBUPROFEN 200 MG
600 TABLET ORAL EVERY 4 HOURS PRN
Status: DISCONTINUED | OUTPATIENT
Start: 2021-10-08 | End: 2022-03-23

## 2021-10-08 RX ADMIN — Medication 600 MG: at 12:17

## 2021-10-08 RX ADMIN — COPPER 1 EACH: 313.4 INTRAUTERINE DEVICE INTRAUTERINE at 12:02

## 2021-10-08 NOTE — TELEPHONE ENCOUNTER
We need to contact JENNY and find out who signed my name without authorization. Also, the patient needs an appointment to determine her personal risk prior to ordering as family hx, genetic testing, etc all play a role and depending on her hx she may need more than just Mammo. She may sched an appt with me or her GYN to review this and then if indicated, get the appropriate order.

## 2021-10-08 NOTE — PROGRESS NOTES
Procedure: IUD Insertion Procedure Note     Procedures    Pre procedure indication 1) Desires ParaGard  Post procedure indication 1) Desires ParaGard    NDC: Jim  [id number]  Lot #: [id number]  Exp Date: 4/2027  BH device    The risks, benefits, and alternatives to ParaGard were explained at length with the patient. All her questions were answered and consents were signed.    The patient was placed in a dorsal lithotomy position on the examining table in [facility]. A bimanual exam confirmed the uterus was normal in size, retroflexed. A speculum was inserted into the vagina and the cervix was brought into view.    The cervix was prepped with Betadine. The anterior lip was grasped with a single-tooth tenaculum. The endometrial cavity was then sounded to 8 cm without use of a dilator. This sealed ParaGard package was opened and the IUD was removed in a sterile fashion.    The upper edge of the depth setting the flange was set at a uterine sound measured. The  was then carefully advanced to the cervical canal into the uterus to the level of the fundus.  The T-arms were deployed via the . The  was removed carefully from the uterus. The threads were then cut leaving 2-3 cm visible outside of the cervix.  The single-tooth tenaculum was removed from the anterior lip. Good hemostasis was noted.     All other instruments were removed from the vagina.   There were no complications.  The patient tolerated the procedure well with a minimal amount of discomfort.    The patient was counseled about the need to return in 4 weeks for IUD check.     She was counseled about the need to use a backup method of contraception such as condoms until her post insertion exam was performed. The patient verbalized understanding that the ParaGard will need to be removed/replaced after 10 years. The patient is counseled to contact us if she has any significant or increasing bleeding, pain, fever, chills, or other  concerns. She is instructed to see a doctor right away if she believes that she may be pregnant at any time with the IUD in place.    Kadeem Lo, APRN  10/08/2021

## 2021-10-11 ENCOUNTER — TELEPHONE (OUTPATIENT)
Dept: FAMILY MEDICINE CLINIC | Facility: CLINIC | Age: 31
End: 2021-10-11

## 2021-12-03 PROBLEM — K64.9 HEMORRHOIDS: Status: ACTIVE | Noted: 2021-12-03

## 2021-12-03 PROBLEM — K46.9 ABDOMINAL HERNIA: Status: ACTIVE | Noted: 2021-12-03

## 2022-03-14 ENCOUNTER — TELEPHONE (OUTPATIENT)
Dept: FAMILY MEDICINE CLINIC | Facility: CLINIC | Age: 32
End: 2022-03-14

## 2022-03-14 NOTE — TELEPHONE ENCOUNTER
Caller: Gisselle White    Relationship to patient: Self    Best call back number: [phone]    Chief complaint:SINUS AND YEAST INFECTION     Type of visit: OFFICE VISIT     Requested date: TOMORROW , 03/15/2022         Additional notes:PATIENT PREFERS TO COME IN  EARLY IN THE MORNING AND  UNTIL 12:30 PM OR SHE COULD COME IN AFTER 3:15 PM     HUB ATTEMPTED TO SCHEDULE WITH PCP BUT HAD NO AVAILABILITY.

## 2022-03-15 NOTE — TELEPHONE ENCOUNTER
CALLED AND SPOKE TO PATIENT. NO AVALIABILITY FOR THE REST OF THE WEEK. ADVISED TO GO TO UC IF ISSUES PERSISTED.

## 2022-03-23 PROCEDURE — 87081 CULTURE SCREEN ONLY: CPT | Performed by: NURSE PRACTITIONER

## 2022-03-23 PROCEDURE — 87147 CULTURE TYPE IMMUNOLOGIC: CPT | Performed by: NURSE PRACTITIONER

## 2022-03-25 ENCOUNTER — TELEPHONE (OUTPATIENT)
Dept: URGENT CARE | Facility: CLINIC | Age: 32
End: 2022-03-25

## 2022-03-25 DIAGNOSIS — J06.9 VIRAL URI: Primary | ICD-10-CM

## 2022-03-25 RX ORDER — AMOXICILLIN 875 MG/1
875 TABLET, COATED ORAL 2 TIMES DAILY
Qty: 20 TABLET | Refills: 0 | Status: SHIPPED | OUTPATIENT
Start: 2022-03-25 | End: 2022-04-04

## 2022-03-25 RX ORDER — FLUCONAZOLE 150 MG/1
150 TABLET ORAL ONCE
Qty: 1 TABLET | Refills: 0 | Status: SHIPPED | OUTPATIENT
Start: 2022-03-25 | End: 2022-03-25

## 2022-03-25 NOTE — TELEPHONE ENCOUNTER
Called patient, results reviewed, will start Amoxicillin, follow up as needed or if symptoms worsen or persist, new toothbrush, patient verbalized understanding.

## 2022-03-25 NOTE — TELEPHONE ENCOUNTER
Patient called office, states that any time she takes an antibiotic she gets a yeast infection and has to have diflucan, RX diflucan sent in to patient's pharmacy.  Patient to follow up as needed or if symptoms worsen or persist.

## 2022-04-27 ENCOUNTER — TELEPHONE (OUTPATIENT)
Dept: FAMILY MEDICINE CLINIC | Facility: CLINIC | Age: 32
End: 2022-04-27

## 2022-04-27 NOTE — TELEPHONE ENCOUNTER
Caller: Gisselle White    Relationship: Self    Best call back number: [phone]    What medication are you requesting: PCP RECOMMENDATION- PAULIE    What are your current symptoms: YEAST INFECTION    How long have you been experiencing symptoms: TWO DAYS    Have you had these symptoms before:    [x] Yes  [] No    Have you been treated for these symptoms before:   [x] Yes  [] No    If a prescription is needed, what is your preferred pharmacy and phone number: [id number] - [place], [place] - [address] AT [facility] [phone] [facility] [phone]      Additional notes:  THE PATIENT STATED SHE HAS A YEAST INFECTION AND WOULD LIKE A MEDICATION SENT TO THE PHARMACY. SHE WOULD LIKE A CALL BACK TO CONFIRM

## 2022-05-19 ENCOUNTER — TELEPHONE (OUTPATIENT)
Dept: FAMILY MEDICINE CLINIC | Facility: CLINIC | Age: 32
End: 2022-05-19

## 2022-05-19 NOTE — TELEPHONE ENCOUNTER
Patient needs an appointment for evaluation, she called with the same complaint 3 weeks ago as well.

## 2022-05-19 NOTE — TELEPHONE ENCOUNTER
Caller: Gisselle White    Relationship: Self    Best call back number: [phone]    What medication are you requesting: DIFLUCAN     What are your current symptoms: ITCHING AND IRRITATION AND WHITE DISCHARGE      How long have you been experiencing symptoms: ABOUT A WEEK     Have you had these symptoms before:    [x] Yes  [] No    Have you been treated for these symptoms before:   [x] Yes  [] No    If a prescription is needed, what is your preferred pharmacy and phone number: [facility] #[id number] - [place], [place] - [address] AT [facility] [phone] [facility] [phone]      Additional notes:

## 2022-05-19 NOTE — TELEPHONE ENCOUNTER
Pt called back stating she had not received any reply about med request - advised MyChart msg was sent and she would need an appt to be evaluated - advised pt no available appts this late in the day and Elaine is out of office on Fridays. No available appts w/any other provider for this week. Pt stated she would go to .

## 2022-05-20 PROCEDURE — 87480 CANDIDA DNA DIR PROBE: CPT | Performed by: NURSE PRACTITIONER

## 2022-05-20 PROCEDURE — 87660 TRICHOMONAS VAGIN DIR PROBE: CPT | Performed by: NURSE PRACTITIONER

## 2022-05-20 PROCEDURE — 87510 GARDNER VAG DNA DIR PROBE: CPT | Performed by: NURSE PRACTITIONER

## 2022-05-20 PROCEDURE — 87591 N.GONORRHOEAE DNA AMP PROB: CPT | Performed by: NURSE PRACTITIONER

## 2022-05-20 PROCEDURE — 87491 CHLMYD TRACH DNA AMP PROBE: CPT | Performed by: NURSE PRACTITIONER

## 2022-05-21 ENCOUNTER — TELEPHONE (OUTPATIENT)
Dept: URGENT CARE | Facility: CLINIC | Age: 32
End: 2022-05-21

## 2022-05-21 NOTE — TELEPHONE ENCOUNTER
----- Message from MIGUEL Smart sent at 5/20/2022  9:47 PM EDT -----  Please notify patient of negative gonorrhea and Chlamydia test and negative bacterial vaginosis and trichomonas test.  She did test positive for a yeast infection.  She was treated with Diflucan at today's visit which should help with her symptoms.  If her symptoms worsen or persist she should follow-up with her PCP or GYN.

## 2022-05-25 ENCOUNTER — TELEPHONE (OUTPATIENT)
Dept: URGENT CARE | Facility: CLINIC | Age: 32
End: 2022-05-25

## 2022-05-25 DIAGNOSIS — N89.8 VAGINAL DISCHARGE: Primary | ICD-10-CM

## 2022-05-25 RX ORDER — FLUCONAZOLE 150 MG/1
150 TABLET ORAL ONCE
Qty: 1 TABLET | Refills: 0 | Status: SHIPPED | OUTPATIENT
Start: 2022-05-26 | End: 2022-05-26

## 2022-05-26 NOTE — TELEPHONE ENCOUNTER
Patient called the office, states that she has misplaced her second Diflucan tablet.  She is still having some vaginal discharge and itching at this time after taking her first Diflucan pill.  She is requesting a refill on 1 Diflucan tablet.  I have sent this medication to the pharmacy she has on file.  The patient should follow-up with her PCP or GYN if her symptoms worsen or persist.

## 2022-06-21 ENCOUNTER — OFFICE VISIT (OUTPATIENT)
Dept: FAMILY MEDICINE CLINIC | Facility: CLINIC | Age: 32
End: 2022-06-21

## 2022-06-21 ENCOUNTER — LAB (OUTPATIENT)
Dept: LAB | Facility: HOSPITAL | Age: 32
End: 2022-06-21

## 2022-06-21 VITALS
WEIGHT: 149 LBS | SYSTOLIC BLOOD PRESSURE: 102 MMHG | DIASTOLIC BLOOD PRESSURE: 63 MMHG | HEART RATE: 58 BPM | OXYGEN SATURATION: 97 % | BODY MASS INDEX: 23.95 KG/M2 | HEIGHT: 66 IN

## 2022-06-21 DIAGNOSIS — N92.0 MENORRHAGIA WITH REGULAR CYCLE: ICD-10-CM

## 2022-06-21 DIAGNOSIS — Z13.29 SCREENING FOR THYROID DISORDER: ICD-10-CM

## 2022-06-21 DIAGNOSIS — N89.8 VAGINAL DISCHARGE: ICD-10-CM

## 2022-06-21 DIAGNOSIS — R53.83 FATIGUE, UNSPECIFIED TYPE: ICD-10-CM

## 2022-06-21 DIAGNOSIS — Z01.89 ROUTINE LAB DRAW: Primary | ICD-10-CM

## 2022-06-21 DIAGNOSIS — N76.0 ACUTE VAGINITIS: ICD-10-CM

## 2022-06-21 LAB
25(OH)D3 SERPL-MCNC: 52.5 NG/ML (ref 30–100)
ALBUMIN SERPL-MCNC: 4.4 G/DL (ref 3.5–5.2)
ALBUMIN/GLOB SERPL: 1.9 G/DL
ALP SERPL-CCNC: 34 U/L (ref 39–117)
ALT SERPL W P-5'-P-CCNC: 9 U/L (ref 1–33)
ANION GAP SERPL CALCULATED.3IONS-SCNC: 9 MMOL/L (ref 5–15)
AST SERPL-CCNC: 14 U/L (ref 1–32)
BASOPHILS # BLD AUTO: 0.02 10*3/MM3 (ref 0–0.2)
BASOPHILS NFR BLD AUTO: 0.4 % (ref 0–1.5)
BILIRUB SERPL-MCNC: 0.4 MG/DL (ref 0–1.2)
BUN SERPL-MCNC: 11 MG/DL (ref 6–20)
BUN/CREAT SERPL: 16.4 (ref 7–25)
CALCIUM SPEC-SCNC: 9.6 MG/DL (ref 8.6–10.5)
CANDIDA SPECIES: POSITIVE
CHLORIDE SERPL-SCNC: 107 MMOL/L (ref 98–107)
CO2 SERPL-SCNC: 25 MMOL/L (ref 22–29)
CREAT SERPL-MCNC: 0.67 MG/DL (ref 0.57–1)
DEPRECATED RDW RBC AUTO: 40.3 FL (ref 37–54)
EGFRCR SERPLBLD CKD-EPI 2021: 120.8 ML/MIN/1.73
EOSINOPHIL # BLD AUTO: 0.16 10*3/MM3 (ref 0–0.4)
EOSINOPHIL NFR BLD AUTO: 3 % (ref 0.3–6.2)
ERYTHROCYTE [DISTWIDTH] IN BLOOD BY AUTOMATED COUNT: 14.7 % (ref 12.3–15.4)
GARDNERELLA VAGINALIS: NEGATIVE
GLOBULIN UR ELPH-MCNC: 2.3 GM/DL
GLUCOSE SERPL-MCNC: 80 MG/DL (ref 65–99)
HCT VFR BLD AUTO: 33.8 % (ref 34–46.6)
HGB BLD-MCNC: 9.6 G/DL (ref 12–15.9)
IMM GRANULOCYTES # BLD AUTO: 0.01 10*3/MM3 (ref 0–0.05)
IMM GRANULOCYTES NFR BLD AUTO: 0.2 % (ref 0–0.5)
IRON 24H UR-MRATE: 19 MCG/DL (ref 37–145)
IRON SATN MFR SERPL: 4 % (ref 20–50)
LYMPHOCYTES # BLD AUTO: 2.13 10*3/MM3 (ref 0.7–3.1)
LYMPHOCYTES NFR BLD AUTO: 39.3 % (ref 19.6–45.3)
MCH RBC QN AUTO: 21.9 PG (ref 26.6–33)
MCHC RBC AUTO-ENTMCNC: 28.4 G/DL (ref 31.5–35.7)
MCV RBC AUTO: 77 FL (ref 79–97)
MONOCYTES # BLD AUTO: 0.45 10*3/MM3 (ref 0.1–0.9)
MONOCYTES NFR BLD AUTO: 8.3 % (ref 5–12)
NEUTROPHILS NFR BLD AUTO: 2.65 10*3/MM3 (ref 1.7–7)
NEUTROPHILS NFR BLD AUTO: 48.8 % (ref 42.7–76)
NRBC BLD AUTO-RTO: 0 /100 WBC (ref 0–0.2)
PLATELET # BLD AUTO: 309 10*3/MM3 (ref 140–450)
PMV BLD AUTO: 11.2 FL (ref 6–12)
POTASSIUM SERPL-SCNC: 4.3 MMOL/L (ref 3.5–5.2)
PROT SERPL-MCNC: 6.7 G/DL (ref 6–8.5)
RBC # BLD AUTO: 4.39 10*6/MM3 (ref 3.77–5.28)
SODIUM SERPL-SCNC: 141 MMOL/L (ref 136–145)
T VAGINALIS DNA VAG QL PROBE+SIG AMP: NEGATIVE
T4 FREE SERPL-MCNC: 1.09 NG/DL (ref 0.93–1.7)
TIBC SERPL-MCNC: 454 MCG/DL (ref 298–536)
TRANSFERRIN SERPL-MCNC: 305 MG/DL (ref 200–360)
TSH SERPL DL<=0.05 MIU/L-ACNC: 1.92 UIU/ML (ref 0.27–4.2)
VIT B12 BLD-MCNC: 604 PG/ML (ref 211–946)
WBC NRBC COR # BLD: 5.42 10*3/MM3 (ref 3.4–10.8)

## 2022-06-21 PROCEDURE — 85025 COMPLETE CBC W/AUTO DIFF WBC: CPT | Performed by: NURSE PRACTITIONER

## 2022-06-21 PROCEDURE — 87480 CANDIDA DNA DIR PROBE: CPT | Performed by: NURSE PRACTITIONER

## 2022-06-21 PROCEDURE — 83002 ASSAY OF GONADOTROPIN (LH): CPT | Performed by: NURSE PRACTITIONER

## 2022-06-21 PROCEDURE — 84443 ASSAY THYROID STIM HORMONE: CPT | Performed by: NURSE PRACTITIONER

## 2022-06-21 PROCEDURE — 80053 COMPREHEN METABOLIC PANEL: CPT | Performed by: NURSE PRACTITIONER

## 2022-06-21 PROCEDURE — 84439 ASSAY OF FREE THYROXINE: CPT | Performed by: NURSE PRACTITIONER

## 2022-06-21 PROCEDURE — 84466 ASSAY OF TRANSFERRIN: CPT | Performed by: NURSE PRACTITIONER

## 2022-06-21 PROCEDURE — 87510 GARDNER VAG DNA DIR PROBE: CPT | Performed by: NURSE PRACTITIONER

## 2022-06-21 PROCEDURE — 82607 VITAMIN B-12: CPT | Performed by: NURSE PRACTITIONER

## 2022-06-21 PROCEDURE — 36415 COLL VENOUS BLD VENIPUNCTURE: CPT | Performed by: NURSE PRACTITIONER

## 2022-06-21 PROCEDURE — 87591 N.GONORRHOEAE DNA AMP PROB: CPT | Performed by: NURSE PRACTITIONER

## 2022-06-21 PROCEDURE — 99214 OFFICE O/P EST MOD 30 MIN: CPT | Performed by: NURSE PRACTITIONER

## 2022-06-21 PROCEDURE — 82672 ASSAY OF ESTROGEN: CPT | Performed by: NURSE PRACTITIONER

## 2022-06-21 PROCEDURE — 82306 VITAMIN D 25 HYDROXY: CPT | Performed by: NURSE PRACTITIONER

## 2022-06-21 PROCEDURE — 83540 ASSAY OF IRON: CPT | Performed by: NURSE PRACTITIONER

## 2022-06-21 PROCEDURE — 87660 TRICHOMONAS VAGIN DIR PROBE: CPT | Performed by: NURSE PRACTITIONER

## 2022-06-21 PROCEDURE — 87661 TRICHOMONAS VAGINALIS AMPLIF: CPT | Performed by: NURSE PRACTITIONER

## 2022-06-21 PROCEDURE — 87491 CHLMYD TRACH DNA AMP PROBE: CPT | Performed by: NURSE PRACTITIONER

## 2022-06-21 PROCEDURE — 83001 ASSAY OF GONADOTROPIN (FSH): CPT | Performed by: NURSE PRACTITIONER

## 2022-06-21 PROCEDURE — 84144 ASSAY OF PROGESTERONE: CPT | Performed by: NURSE PRACTITIONER

## 2022-06-21 RX ORDER — FLUCONAZOLE 150 MG/1
150 TABLET ORAL
Qty: 2 TABLET | Refills: 0 | Status: SHIPPED | OUTPATIENT
Start: 2022-06-21 | End: 2022-06-28

## 2022-06-21 NOTE — PROGRESS NOTES
Chief Complaint  Vaginal Itching and Lab work (Patient would like to have labs done to check vitamin and hormone levels)    Subjective          Gisselle White presents to [facility] for   History of Present Illness  Patient complaining of possible hormone issues.  States that during her ovulation she becomes very hammonds and aware of the fact that she does not like how she feels.  States she feels like her hormones are all over the place and is requesting them to be checked.  Also complaining of significant fatigue.  Also complains of a very heavy periods.  Patient does have a ParaGard, states that she had fairly heavy periods prior to having the ParaGard, but they are significantly worse since having it.  Patient complaining of vaginal itching, onset 3 days, states significantly worsening this morning.  States she is having a lot of discharge and feels like it is a yeast infection, however she cannot rule out STI due to partner infidelity and would like to go ahead and be checked.  Medical History  Past Medical History:   Diagnosis Date   • Condition not found     HERNIA   • Hemorrhoids      Surgical History  History reviewed. No pertinent surgical history.  Social History  Social History     Socioeconomic History   • Marital status:    Tobacco Use   • Smoking status: Never Smoker   • Smokeless tobacco: Never Used   Vaping Use   • Vaping Use: Never used   Substance and Sexual Activity   • Alcohol use: Not Currently   • Drug use: Never   • Sexual activity: Yes     Partners: Male     Birth control/protection: Condom       Current Outpatient Medications:   •  fluconazole (Diflucan) 150 MG tablet, Take 1 tablet by mouth Every 72 (Seventy-Two) Hours As Needed (Vaginal discharge.)., Disp: 2 tablet, Rfl: 0    Current Facility-Administered Medications:   •  Paragard Intrauterine Copper IUD, , Intrauterine, Once, Kadeem Lo, APRN    Review of Systems     Objective  "    /63   Pulse 58   Ht 167.6 cm (66\")   Wt 67.6 kg (149 lb)   SpO2 97%   BMI 24.05 kg/m²     Body mass index is 24.05 kg/m².    Physical Exam  Vitals reviewed.   Constitutional:       Appearance: Normal appearance. She is well-developed.   HENT:      Head: Normocephalic and atraumatic.      Right Ear: External ear normal.      Left Ear: External ear normal.   Eyes:      Conjunctiva/sclera: Conjunctivae normal.      Pupils: Pupils are equal, round, and reactive to light.   Cardiovascular:      Rate and Rhythm: Normal rate and regular rhythm.      Heart sounds: No murmur heard.    No friction rub. No gallop.   Pulmonary:      Effort: Pulmonary effort is normal.      Breath sounds: Normal breath sounds. No wheezing or rhonchi.   Genitourinary:     Comments: Patient opted to self swab, discharge noted to be white and cottage cheese like   Skin:     General: Skin is warm and dry.   Neurological:      Mental Status: She is alert and oriented to person, place, and time.      Cranial Nerves: No cranial nerve deficit.   Psychiatric:         Mood and Affect: Mood and affect normal.         Behavior: Behavior normal.         Thought Content: Thought content normal.         Judgment: Judgment normal.         Result Review :     The following data was reviewed by: MIGUEL Welsh on 06/21/2022:                         Assessment:  Diagnoses and all orders for this visit:    1. Routine lab draw (Primary)  -     CBC w AUTO Differential; Future  -     Comprehensive metabolic panel; Future  -     Vitamin D 25 hydroxy; Future  -     Vitamin B12; Future  -     Vitamin B12  -     Vitamin D 25 hydroxy  -     Comprehensive metabolic panel  -     CBC w AUTO Differential    2. Screening for thyroid disorder  -     TSH+Free T4; Future  -     TSH+Free T4    3. Fatigue, unspecified type  -     Estrogens, Total; Future  -     Follicle Stimulating Hormone; Future  -     Luteinizing Hormone; Future  -     Progesterone; " Future  -     TSH+Free T4; Future  -     Iron Profile; Future  -     Iron Profile  -     TSH+Free T4  -     Progesterone  -     Luteinizing Hormone  -     Follicle Stimulating Hormone  -     Estrogens, Total    4. Menorrhagia with regular cycle  -     Estrogens, Total; Future  -     Follicle Stimulating Hormone; Future  -     Luteinizing Hormone; Future  -     Progesterone; Future  -     TSH+Free T4; Future  -     Iron Profile; Future  -     Iron Profile  -     TSH+Free T4  -     Progesterone  -     Luteinizing Hormone  -     Follicle Stimulating Hormone  -     Estrogens, Total    5. Acute vaginitis  -     Gardnerella vaginalis, Trichomonas vaginalis, Candida albicans, DNA - Swab, Vagina; Future  -     Chlamydia trachomatis, Neisseria gonorrhoeae, Trichomonas vaginalis, PCR - Swab, Vagina; Future  -     Chlamydia trachomatis, Neisseria gonorrhoeae, Trichomonas vaginalis, PCR - Swab, Vagina  -     Gardnerella vaginalis, Trichomonas vaginalis, Candida albicans, DNA - Swab, Vagina    6. Vaginal discharge  -     fluconazole (Diflucan) 150 MG tablet; Take 1 tablet by mouth Every 72 (Seventy-Two) Hours As Needed (Vaginal discharge.).  Dispense: 2 tablet; Refill: 0  -     Gardnerella vaginalis, Trichomonas vaginalis, Candida albicans, DNA - Swab, Vagina; Future  -     Chlamydia trachomatis, Neisseria gonorrhoeae, Trichomonas vaginalis, PCR - Swab, Vagina; Future  -     Chlamydia trachomatis, Neisseria gonorrhoeae, Trichomonas vaginalis, PCR - Swab, Vagina  -     Gardnerella vaginalis, Trichomonas vaginalis, Candida albicans, DNA - Swab, Vagina      We discussed the possibility of trialing a low-dose Effexor if hormone panel is within normal limits.  Patient verbalized understanding is interested in doing so.        Follow Up     Return if symptoms worsen or fail to improve.    Patient was given instructions and counseling regarding her condition or for health maintenance advice. Please see specific information pulled into  the AVS if appropriate.     Elaine Young, APRN  06/21/2022

## 2022-06-22 ENCOUNTER — TELEPHONE (OUTPATIENT)
Dept: FAMILY MEDICINE CLINIC | Facility: CLINIC | Age: 32
End: 2022-06-22

## 2022-06-22 LAB
C TRACH RRNA SPEC QL NAA+PROBE: NORMAL
FSH SERPL-ACNC: 7.15 MIU/ML
LH SERPL-ACNC: 10.3 MIU/ML
N GONORRHOEA RRNA SPEC QL NAA+PROBE: NORMAL
PROGEST SERPL-MCNC: 8.09 NG/ML
T VAGINALIS RRNA SPEC QL NAA+PROBE: NORMAL

## 2022-06-22 RX ORDER — FERROUS SULFATE 325(65) MG
325 TABLET ORAL
Qty: 90 TABLET | Refills: 1 | Status: SHIPPED | OUTPATIENT
Start: 2022-06-22

## 2022-06-22 NOTE — TELEPHONE ENCOUNTER
----- Message from MIGUEL Small sent at 6/22/2022  8:18 AM EDT -----  Positive for yeast infection, treated at office visit yesterday with Diflucan.  Complete medication and follow-up if no improvement.  B12 and vitamin D levels are normal.  Patient is iron deficient, actually worse than last time, start ferrous sulfate 325 mg 1 tab p.o. daily, #90 with 1 refill.  Please inform patient that this can cause dark-colored stools and may cause constipation.  Recommend increase fiber and fluids.  Thyroid levels are normal, hormone levels are normal thus far, still awaiting the results of the estrogen.  Also still awaiting STD screen results.  Patient is noted to be anemic, hemoglobin is 9.6, this is quite a bit lower than it was when we checked it in August of last year.  If patient continues to be this anemic from her menstrual cycles she may have to reconsider her ParaGard IUD.  We will start the iron and recheck a CBC and iron panel in 2 months.

## 2022-06-23 ENCOUNTER — TELEPHONE (OUTPATIENT)
Dept: FAMILY MEDICINE CLINIC | Facility: CLINIC | Age: 32
End: 2022-06-23

## 2022-06-23 DIAGNOSIS — N89.8 VAGINAL DISCHARGE: Primary | ICD-10-CM

## 2022-06-23 NOTE — TELEPHONE ENCOUNTER
----- Message from MIGUEL Small sent at 6/23/2022 11:09 AM EDT -----  I know this swab was sent, please contact the lab to find out why this was not done

## 2022-06-23 NOTE — TELEPHONE ENCOUNTER
I called the lab back and she said the vaginosis panel did not test for the gonorrhea or chlamydia, she thought it did. She said the red top we sent was the incorrect swab for the test ordered. She said it was suppose to be the pink top.The red top we sent is to test for HSV 1 and 2. Did you want patient to come back to be swabbed again?

## 2022-06-23 NOTE — TELEPHONE ENCOUNTER
I have reviewed her labs and I cannot find a result for the chlamydia and gonorrhea test.  The vaginosis panel does check for trichomonas, but not chlamydia and gonorrhea.  Are these results somewhere else, or was the patient not tested for this.?

## 2022-06-23 NOTE — TELEPHONE ENCOUNTER
I called the lab and thy said that order was like a duplicate test as the same results came back on the vaginosis panel. They said this test has to have a special kit.

## 2022-06-24 ENCOUNTER — LAB (OUTPATIENT)
Dept: LAB | Facility: HOSPITAL | Age: 32
End: 2022-06-24

## 2022-06-24 DIAGNOSIS — N89.8 VAGINAL DISCHARGE: ICD-10-CM

## 2022-06-24 LAB
C TRACH RRNA CVX QL NAA+PROBE: NOT DETECTED
ESTROGEN SERPL-MCNC: 190 PG/ML
N GONORRHOEA RRNA SPEC QL NAA+PROBE: NOT DETECTED

## 2022-06-24 PROCEDURE — 87491 CHLMYD TRACH DNA AMP PROBE: CPT

## 2022-06-24 PROCEDURE — 87591 N.GONORRHOEAE DNA AMP PROB: CPT

## 2022-06-24 NOTE — TELEPHONE ENCOUNTER
Pt called in regards to recent labs. CG/ Chlamydia was not ran. Ok per  Diane to order a dirty urine for std check. Pt will report to the outpt lab. Pt preferred this instead of having to come back to the office.     Pt also wanted to mention her period is 2 weeks late. Pt states no concern of pregnancy as she has ParaGard and partner has had a vasostomy but she wanted to let her PCP know since her iron was low

## 2022-06-27 ENCOUNTER — TELEPHONE (OUTPATIENT)
Dept: FAMILY MEDICINE CLINIC | Facility: CLINIC | Age: 32
End: 2022-06-27

## 2022-06-27 DIAGNOSIS — N76.0 RECURRENT VAGINITIS: Primary | ICD-10-CM

## 2022-06-27 NOTE — TELEPHONE ENCOUNTER
Pt states she still has a yeast infection. Pt has taken 2 diflucan's. Pt is still symptomatic.     Pt states she has h/o recurring yeast inf. Pt claims she gets a longer dose of diflucan to help.    Pt was advised to try monistat OTC, wear breathable underwear, keep dry, and no sexual activity.     Please advise.    GYN referral wanted.    Please sign.

## 2022-06-28 RX ORDER — FLUCONAZOLE 150 MG/1
TABLET ORAL
Qty: 4 TABLET | Refills: 0 | OUTPATIENT
Start: 2022-06-28 | End: 2022-08-04

## 2022-06-28 NOTE — TELEPHONE ENCOUNTER
Referral placed, recommend patient start daily probiotic, lactobacillus, I will send in prescription for an extended course of Diflucan, patient is to take 1 tablet weekly for 4 weeks.

## 2022-07-22 PROCEDURE — 87081 CULTURE SCREEN ONLY: CPT | Performed by: FAMILY MEDICINE

## 2022-07-24 ENCOUNTER — TELEPHONE (OUTPATIENT)
Dept: URGENT CARE | Facility: CLINIC | Age: 32
End: 2022-07-24

## 2022-08-01 ENCOUNTER — TELEPHONE (OUTPATIENT)
Dept: FAMILY MEDICINE CLINIC | Facility: CLINIC | Age: 32
End: 2022-08-01

## 2022-08-01 NOTE — TELEPHONE ENCOUNTER
Patient had an appointment schedule with gynecology but had to cancel due to being sick, she is now scheduled a month out and is wondering if there is anyone else you would recommend she see besides [facility]?

## 2022-08-01 NOTE — TELEPHONE ENCOUNTER
Please call patient, according to computer her appointment is this week, Thursday the fourth.  Is that correct?

## 2022-08-02 PROCEDURE — 87591 N.GONORRHOEAE DNA AMP PROB: CPT | Performed by: FAMILY MEDICINE

## 2022-08-02 PROCEDURE — G0432 EIA HIV-1/HIV-2 SCREEN: HCPCS | Performed by: FAMILY MEDICINE

## 2022-08-02 PROCEDURE — 86592 SYPHILIS TEST NON-TREP QUAL: CPT | Performed by: FAMILY MEDICINE

## 2022-08-02 PROCEDURE — 80074 ACUTE HEPATITIS PANEL: CPT | Performed by: FAMILY MEDICINE

## 2022-08-02 PROCEDURE — 87491 CHLMYD TRACH DNA AMP PROBE: CPT | Performed by: FAMILY MEDICINE

## 2022-08-03 ENCOUNTER — TELEPHONE (OUTPATIENT)
Dept: URGENT CARE | Facility: CLINIC | Age: 32
End: 2022-08-03

## 2022-08-03 NOTE — TELEPHONE ENCOUNTER
----- Message from MIGUEL Smart sent at 8/3/2022  9:19 AM EDT -----  Please notify patient of negative syphilis, HIV, hepatitis and gonorrhea and Chlamydia test results.  Recommend follow-up with PCP or GYN as needed or symptoms worsen or persist.

## 2022-08-04 ENCOUNTER — OFFICE VISIT (OUTPATIENT)
Dept: OBSTETRICS AND GYNECOLOGY | Facility: CLINIC | Age: 32
End: 2022-08-04

## 2022-08-04 VITALS
BODY MASS INDEX: 24.53 KG/M2 | WEIGHT: 152 LBS | HEART RATE: 82 BPM | SYSTOLIC BLOOD PRESSURE: 105 MMHG | DIASTOLIC BLOOD PRESSURE: 74 MMHG

## 2022-08-04 DIAGNOSIS — N76.0 RECURRENT VAGINITIS: Primary | ICD-10-CM

## 2022-08-04 DIAGNOSIS — Z11.3 SCREEN FOR STD (SEXUALLY TRANSMITTED DISEASE): ICD-10-CM

## 2022-08-04 DIAGNOSIS — R10.2 PELVIC PAIN IN FEMALE: ICD-10-CM

## 2022-08-04 DIAGNOSIS — N92.1 MENORRHAGIA WITH IRREGULAR CYCLE: ICD-10-CM

## 2022-08-04 LAB
C TRACH RRNA CVX QL NAA+PROBE: NOT DETECTED
CANDIDA SPECIES: NEGATIVE
GARDNERELLA VAGINALIS: NEGATIVE
N GONORRHOEA RRNA SPEC QL NAA+PROBE: NOT DETECTED
T VAGINALIS DNA VAG QL PROBE+SIG AMP: NEGATIVE

## 2022-08-04 PROCEDURE — 86695 HERPES SIMPLEX TYPE 1 TEST: CPT | Performed by: OBSTETRICS & GYNECOLOGY

## 2022-08-04 PROCEDURE — 87081 CULTURE SCREEN ONLY: CPT | Performed by: OBSTETRICS & GYNECOLOGY

## 2022-08-04 PROCEDURE — 87591 N.GONORRHOEAE DNA AMP PROB: CPT | Performed by: OBSTETRICS & GYNECOLOGY

## 2022-08-04 PROCEDURE — 87109 MYCOPLASMA: CPT | Performed by: OBSTETRICS & GYNECOLOGY

## 2022-08-04 PROCEDURE — 99214 OFFICE O/P EST MOD 30 MIN: CPT | Performed by: OBSTETRICS & GYNECOLOGY

## 2022-08-04 PROCEDURE — 87491 CHLMYD TRACH DNA AMP PROBE: CPT | Performed by: OBSTETRICS & GYNECOLOGY

## 2022-08-04 PROCEDURE — 86696 HERPES SIMPLEX TYPE 2 TEST: CPT | Performed by: OBSTETRICS & GYNECOLOGY

## 2022-08-04 PROCEDURE — 87480 CANDIDA DNA DIR PROBE: CPT | Performed by: OBSTETRICS & GYNECOLOGY

## 2022-08-04 PROCEDURE — 86694 HERPES SIMPLEX NES ANTBDY: CPT | Performed by: OBSTETRICS & GYNECOLOGY

## 2022-08-04 PROCEDURE — 87660 TRICHOMONAS VAGIN DIR PROBE: CPT | Performed by: OBSTETRICS & GYNECOLOGY

## 2022-08-04 PROCEDURE — 87510 GARDNER VAG DNA DIR PROBE: CPT | Performed by: OBSTETRICS & GYNECOLOGY

## 2022-08-04 NOTE — PROGRESS NOTES
GYN Problem/Follow Up Visit    Chief Complaint   Patient presents with   • Menstrual Problem           HPI  Gisselle White is a 32 y.o. female, , who presents for heavy menses, right lower pelvic pain, and recurrent vaginal d/c. States was sent here by her pcp for heavy menses and low iron. Pt states she had paragard inserted 10/21 and her menses seem heavier but less crampy. States menses have always been slightly irreg. Forgets her iron supplement sometimes. C/o pelvic pain for months. States it is intermittent cramping. Also c/o frequent yeast sx. States takes meds and sx get better but come right back.  from her  but they are still sexually active with each other. States had std blood work but they did not check her for hsv and she requests that today also.      Additional OB/GYN History   Patient's last menstrual period was 2022 (exact date).  Current contraception: contraceptive methods: IUD.  Insertion date: 10/21  Desires to: continue contraception  Allergies : Patient has no known allergies.     The additional following portions of the patient's history were reviewed and updated as appropriate: allergies, current medications, past family history, past medical history, past social history, past surgical history and problem list.    Review of Systems    I have reviewed and agree with the HPI, ROS, and historical information as entered above. MIGUEL Kaminski    Objective   /74   Pulse 82   Wt 68.9 kg (152 lb)   LMP 2022 (Exact Date)   BMI 24.53 kg/m²     Physical Exam  Vitals reviewed.   Genitourinary:     General: Normal vulva.      Vagina: No signs of injury and foreign body. Vaginal discharge (thin yellowish) present. No erythema, tenderness, bleeding, lesions or prolapsed vaginal walls.      Cervix: Discharge present. No cervical motion tenderness, friability, lesion, erythema or cervical bleeding.      Uterus: Normal. Not tender.       Adnexa: Left  adnexa normal.        Right: Tenderness (mild) present.         Left: No tenderness.        Comments: iud strings present at 2 cm  Skin:     General: Skin is warm and dry.   Neurological:      Mental Status: She is alert and oriented to person, place, and time.            Assessment and Plan    Diagnoses and all orders for this visit:    1. Recurrent vaginitis (Primary)  -     Chlamydia trachomatis, Neisseria gonorrhoeae, PCR - Swab, Cervix  -     Gardnerella vaginalis, Trichomonas vaginalis, Candida albicans, DNA - Swab, Vagina  -     Group B Streptococcus Culture - Swab, Vagina  -     Mycoplasma / Ureaplasma Culture - Swab, Cervix    2. Menorrhagia with irregular cycle  -     US Non-ob Transvaginal; Future    3. Pelvic pain in female  -     US Non-ob Transvaginal; Future    4. Screen for STD (sexually transmitted disease)  -     HSV 1 & 2 - Specific Antibody, IgG  -     HSV Non-Specific Antibody, IgM    will check for infections/stds. Discussed preventative measures for recurrent vaginitis sx. Will check pelvic u/s. Discussed iud removal due to heavy menses and low iron but pt declines for now. F/u after u/s.    Counseling:  She understands the importance of having the above orders performed in a timely fashion.  She is encouraged to review her results online and/or contact or office if she has questions.     Follow Up:  Return for lab and u/s f/u.      MIGUEL Kaminski  08/04/2022

## 2022-08-06 LAB
BACTERIA SPEC AEROBE CULT: NORMAL
HSV1 IGG SER IA-ACNC: <0.91 INDEX (ref 0–0.9)
HSV2 IGG SER IA-ACNC: <0.91 INDEX (ref 0–0.9)

## 2022-08-09 LAB — HSV1+2 IGM SER IA-ACNC: 1 RATIO (ref 0–0.9)

## 2022-08-10 LAB
M HOMINIS SPEC QL CULT: POSITIVE
U UREALYTICUM SPEC QL CULT: POSITIVE

## 2022-08-11 RX ORDER — DOXYCYCLINE HYCLATE 100 MG/1
100 CAPSULE ORAL 2 TIMES DAILY
Qty: 14 CAPSULE | Refills: 0 | Status: SHIPPED | OUTPATIENT
Start: 2022-08-11 | End: 2022-08-18

## 2022-08-11 NOTE — PROGRESS NOTES
Discussed swab results with patient. Patient is aware that a prescription was sent to pharmacy for treatment. We also discussed the recommendation that her partner be treated so they are not transmitting back and forth.

## 2022-08-30 ENCOUNTER — HOSPITAL ENCOUNTER (OUTPATIENT)
Dept: ULTRASOUND IMAGING | Facility: HOSPITAL | Age: 32
Discharge: HOME | End: 2022-08-30
Admitting: OBSTETRICS & GYNECOLOGY

## 2022-08-30 DIAGNOSIS — N92.1 MENORRHAGIA WITH IRREGULAR CYCLE: ICD-10-CM

## 2022-08-30 DIAGNOSIS — R10.2 PELVIC PAIN IN FEMALE: ICD-10-CM

## 2022-08-30 PROCEDURE — 76830 TRANSVAGINAL US NON-OB: CPT

## 2022-09-04 ENCOUNTER — TELEMEDICINE (OUTPATIENT)
Dept: FAMILY MEDICINE CLINIC | Facility: TELEHEALTH | Age: 32
End: 2022-09-04

## 2022-09-04 DIAGNOSIS — B37.31 VAGINAL YEAST INFECTION: Primary | ICD-10-CM

## 2022-09-04 PROCEDURE — 99213 OFFICE O/P EST LOW 20 MIN: CPT | Performed by: NURSE PRACTITIONER

## 2022-09-04 RX ORDER — FLUCONAZOLE 150 MG/1
TABLET ORAL
Qty: 3 TABLET | Refills: 0 | OUTPATIENT
Start: 2022-09-04 | End: 2023-01-03

## 2022-09-04 NOTE — PROGRESS NOTES
Subjective   Chief Complaint   Patient presents with   • Vaginitis       Gisselle White is a 32 y.o. female.     Patient reports waking up today with vaginal itching, vaginal discomfort and slight white discharge.  Symptoms feel similar to when she has had a yeast infection in the past.    Vaginal Itching  The patient's primary symptoms include genital itching. The patient's pertinent negatives include no genital lesions, genital odor, genital rash, missed menses, pelvic pain, vaginal bleeding or vaginal discharge. This is a new problem. The current episode started today. The problem occurs constantly. The problem has been unchanged. She is not pregnant. Pertinent negatives include no abdominal pain, anorexia, back pain, chills, constipation, diarrhea, discolored urine, dysuria, fever, flank pain, frequency, headaches, hematuria, joint pain, joint swelling, nausea, painful intercourse, rash, sore throat, urgency or vomiting. She has tried nothing for the symptoms. Her sexual activity is non-contributory to the current illness. No, her partner does not have an STD.        No Known Allergies    Past Medical History:   Diagnosis Date   • Condition not found     HERNIA   • Hemorrhoids        History reviewed. No pertinent surgical history.    Social History     Socioeconomic History   • Marital status:    Tobacco Use   • Smoking status: Never Smoker   • Smokeless tobacco: Never Used   Vaping Use   • Vaping Use: Never used   Substance and Sexual Activity   • Alcohol use: Not Currently   • Drug use: Never   • Sexual activity: Yes     Partners: Male     Birth control/protection: I.U.D.       Family History   Problem Relation Age of Onset   • Breast cancer Mother    • Diabetes Mother    • Heart disease Other          Current Outpatient Medications:   •  ferrous sulfate 325 (65 FE) MG tablet, Take 1 tablet by mouth Daily With Breakfast., Disp: 90 tablet, Rfl: 1  •  fluconazole (Diflucan) 150 MG tablet, Take 1  tablet every 3 days until finished., Disp: 3 tablet, Rfl: 0    Current Facility-Administered Medications:   •  Paragard Intrauterine Copper IUD, , Intrauterine, Once, Kadeem Lo, MIGUEL      Review of Systems   Constitutional: Negative for chills and fever.   HENT: Negative for sore throat.    Gastrointestinal: Negative for abdominal pain, anorexia, constipation, diarrhea, nausea and vomiting.   Genitourinary: Negative for dysuria, flank pain, frequency, hematuria, missed menses, pelvic pain, urgency and vaginal discharge.   Musculoskeletal: Negative for back pain and joint pain.   Skin: Negative for rash.        There were no vitals filed for this visit.    Objective   Physical Exam  Constitutional:       General: She is not in acute distress.     Appearance: Normal appearance. She is not ill-appearing, toxic-appearing or diaphoretic.   HENT:      Head: Normocephalic.      Mouth/Throat:      Lips: Pink.      Mouth: Mucous membranes are moist.   Pulmonary:      Effort: Pulmonary effort is normal.   Neurological:      Mental Status: She is alert and oriented to person, place, and time.   Psychiatric:         Mood and Affect: Mood normal.         Behavior: Behavior normal.          Procedures     Assessment & Plan   Diagnoses and all orders for this visit:    1. Vaginal yeast infection (Primary)  -     fluconazole (Diflucan) 150 MG tablet; Take 1 tablet every 3 days until finished.  Dispense: 3 tablet; Refill: 0        Results for orders placed or performed in visit on 08/04/22   Chlamydia trachomatis, Neisseria gonorrhoeae, PCR - Swab, Cervix    Specimen: Cervix; Swab   Result Value Ref Range    Chlamydia DNA by PCR Not Detected Not Detected     Neisseria gonorrhoeae by PCR Not Detected Not Detected    Gardnerella vaginalis, Trichomonas vaginalis, Candida albicans, DNA - Swab, Vagina    Specimen: Vagina; Swab   Result Value Ref Range    GARDNERELLA VAGINALIS Negative Negative    TRICHOMONAS VAGINALIS Negative  Negative    CANDIDA SPECIES Negative Negative   Group B Streptococcus Culture - Swab, Vagina    Specimen: Vagina; Swab   Result Value Ref Range    Group B Strep Culture No Group B Streptococcus isolated    Mycoplasma / Ureaplasma Culture - Swab, Cervix    Specimen: Cervix; Swab   Result Value Ref Range    Ureaplasma urealyticum Positive (A) Negative    Mycoplasma hominis Positive (A) Negative   HSV 1 & 2 - Specific Antibody, IgG    Specimen: Blood   Result Value Ref Range    HSV 1 IgG, Type Specific <0.91 0.00 - 0.90 index    HSV 2 IgG <0.91 0.00 - 0.90 index   HSV Non-Specific Antibody, IgM    Specimen: Blood   Result Value Ref Range    HSVI/II IgM 1.00 (H) 0.00 - 0.90 Ratio       PLAN: Discussed dosing, side effects, recommended other symptomatic care.  Patient should follow up with primary care provider, Urgent Care or ER if symptoms worsen, fail to resolve or other symptoms need attention. Patient/family agree to the above.         MIGUEL Gross     The use of a video visit has been reviewed with the patient and verbal informed consent has been obtained. Myself and Gisselle White participated in this visit. The patient is located at [address] [place] [address]. I am located in [place], [place]. Mychart and Zoom were utilized.        This visit was performed via Telehealth.  This patient has been instructed to follow-up with their primary care provider if their symptoms worsen or the treatment provided does not resolve their illness.

## 2022-09-08 ENCOUNTER — OFFICE VISIT (OUTPATIENT)
Dept: OBSTETRICS AND GYNECOLOGY | Facility: CLINIC | Age: 32
End: 2022-09-08

## 2022-09-08 VITALS
BODY MASS INDEX: 25.07 KG/M2 | HEART RATE: 71 BPM | SYSTOLIC BLOOD PRESSURE: 112 MMHG | HEIGHT: 66 IN | DIASTOLIC BLOOD PRESSURE: 75 MMHG | WEIGHT: 156 LBS

## 2022-09-08 DIAGNOSIS — Z22.39 CARRIER OF UREAPLASMA UREALYTICUM: ICD-10-CM

## 2022-09-08 DIAGNOSIS — A49.3 MYCOPLASMA INFECTION: Primary | ICD-10-CM

## 2022-09-08 DIAGNOSIS — N92.1 MENORRHAGIA WITH IRREGULAR CYCLE: ICD-10-CM

## 2022-09-08 PROCEDURE — 99213 OFFICE O/P EST LOW 20 MIN: CPT | Performed by: OBSTETRICS & GYNECOLOGY

## 2022-09-08 RX ORDER — LEVOFLOXACIN 500 MG/1
500 TABLET, FILM COATED ORAL DAILY
Qty: 5 TABLET | Refills: 0 | Status: SHIPPED | OUTPATIENT
Start: 2022-09-08 | End: 2022-09-13

## 2022-09-08 RX ORDER — FLUCONAZOLE 150 MG/1
150 TABLET ORAL
Qty: 2 TABLET | Refills: 0 | Status: SHIPPED | OUTPATIENT
Start: 2022-09-08 | End: 2022-09-12

## 2022-09-08 NOTE — PROGRESS NOTES
"GYN Problem/Follow Up Visit    Chief Complaint   Patient presents with   • FOLLOW UP ULTRASOUND           HPI  Gisselle White is a 32 y.o. female, , who presents for u/s f/u. Seen in office 22 with c/o heavy menses, pelvic pain, and recurrent vaginitis. Was found to have mycoplasma and ureaplasma. States tried taking doxy but forgot some pills and it made her very nauseous. Feels like she has a yeast infection. Partner has not been treated.        Additional OB/GYN History   Patient's last menstrual period was 2022 (approximate).  Current contraception: contraceptive methods: IUD.  Insertion date: 10/21  Desires to: continue contraception  Allergies : Patient has no known allergies.     The additional following portions of the patient's history were reviewed and updated as appropriate: allergies, current medications, past family history, past medical history, past social history, past surgical history and problem list.    Review of Systems    I have reviewed and agree with the HPI, ROS, and historical information as entered above. Heena Stinson, APRN    Objective   /75   Pulse 71   Ht 167.6 cm (66\")   Wt 70.8 kg (156 lb)   LMP 2022 (Approximate)   BMI 25.18 kg/m²     Physical Exam  Vitals reviewed.   Neurological:      Mental Status: She is alert and oriented to person, place, and time.            Assessment and Plan    Diagnoses and all orders for this visit:    1. Mycoplasma infection (Primary)  -     levoFLOXacin (Levaquin) 500 MG tablet; Take 1 tablet by mouth Daily for 5 days.  Dispense: 5 tablet; Refill: 0  -     fluconazole (Diflucan) 150 MG tablet; Take 1 tablet by mouth Every 72 (Seventy-Two) Hours for 2 doses.  Dispense: 2 tablet; Refill: 0    2. Carrier of ureaplasma urealyticum  -     levoFLOXacin (Levaquin) 500 MG tablet; Take 1 tablet by mouth Daily for 5 days.  Dispense: 5 tablet; Refill: 0  -     fluconazole (Diflucan) 150 MG tablet; Take 1 tablet by mouth " Every 72 (Seventy-Two) Hours for 2 doses.  Dispense: 2 tablet; Refill: 0    3. Menorrhagia with irregular cycle    reviewed labs done 8/4/22: hsv borderline. Discussed repeating in three months. Will send in diflucan and levaquin for the infections and yeast sx. She will schedule a sallie appt. Discussed importance of partner treatment and condom use. Reviewed pelvic u/s done 8/30/22: iud in place. Normal u/s. Pt desires to continue paragard for now.     Counseling:  She understands the importance of having the above orders performed in a timely fashion.  She is encouraged to review her results online and/or contact or office if she has questions.     Follow Up:  Return in about 3 weeks (around 9/29/2022) for sallie appt.      Heena Stinson, MIGUEL  09/08/2022

## 2022-09-29 ENCOUNTER — OFFICE VISIT (OUTPATIENT)
Dept: OBSTETRICS AND GYNECOLOGY | Facility: CLINIC | Age: 32
End: 2022-09-29

## 2022-09-29 VITALS
HEART RATE: 72 BPM | SYSTOLIC BLOOD PRESSURE: 94 MMHG | DIASTOLIC BLOOD PRESSURE: 63 MMHG | WEIGHT: 160 LBS | HEIGHT: 66 IN | BODY MASS INDEX: 25.71 KG/M2

## 2022-09-29 DIAGNOSIS — N89.8 VAGINAL DISCHARGE: Primary | ICD-10-CM

## 2022-09-29 LAB
CANDIDA SPECIES: NEGATIVE
GARDNERELLA VAGINALIS: POSITIVE
T VAGINALIS DNA VAG QL PROBE+SIG AMP: NEGATIVE

## 2022-09-29 PROCEDURE — 99213 OFFICE O/P EST LOW 20 MIN: CPT | Performed by: OBSTETRICS & GYNECOLOGY

## 2022-09-29 PROCEDURE — 87109 MYCOPLASMA: CPT | Performed by: OBSTETRICS & GYNECOLOGY

## 2022-09-29 PROCEDURE — 87510 GARDNER VAG DNA DIR PROBE: CPT | Performed by: OBSTETRICS & GYNECOLOGY

## 2022-09-29 PROCEDURE — 87480 CANDIDA DNA DIR PROBE: CPT | Performed by: OBSTETRICS & GYNECOLOGY

## 2022-09-29 PROCEDURE — 87660 TRICHOMONAS VAGIN DIR PROBE: CPT | Performed by: OBSTETRICS & GYNECOLOGY

## 2022-09-29 RX ORDER — METRONIDAZOLE 500 MG/1
500 TABLET ORAL 2 TIMES DAILY
Qty: 20 TABLET | Refills: 0 | Status: SHIPPED | OUTPATIENT
Start: 2022-09-29 | End: 2022-10-09

## 2022-09-29 NOTE — PROGRESS NOTES
"GYN Problem/Follow Up Visit    Chief Complaint   Patient presents with   • FOLLOW UP TEST OF CURE           HPI  Gisselle White is a 32 y.o. female, , who presents for sallie. Recently tested positive for ureaplasma and mycoplasma. Took meds and partner was treated. They have had unprotected intercourse since tx. Pt c/o vaginal d/c.        Additional OB/GYN History   No LMP recorded.  Current contraception: contraceptive methods: IUD.  Insertion date: 10/21  Desires to: continue contraception  Allergies : Patient has no known allergies.     The additional following portions of the patient's history were reviewed and updated as appropriate: allergies, current medications, past family history, past medical history, past social history, past surgical history and problem list.    Review of Systems    I have reviewed and agree with the HPI, ROS, and historical information as entered above. Heena Stinson, APRN    Objective   BP 94/63   Pulse 72   Ht 167.6 cm (66\")   Wt 72.6 kg (160 lb)   BMI 25.82 kg/m²     Physical Exam  Vitals reviewed.   Genitourinary:     General: Normal vulva.      Vagina: No signs of injury and foreign body. Vaginal discharge (thin white) present. No erythema, tenderness, bleeding, lesions or prolapsed vaginal walls.      Cervix: Discharge present. No cervical motion tenderness, friability, lesion, erythema or cervical bleeding.   Skin:     General: Skin is warm and dry.   Neurological:      Mental Status: She is alert and oriented to person, place, and time.            Assessment and Plan    Diagnoses and all orders for this visit:    1. Vaginal discharge (Primary)  -     metroNIDAZOLE (Flagyl) 500 MG tablet; Take 1 tablet by mouth 2 (Two) Times a Day for 10 days.  Dispense: 20 tablet; Refill: 0  -     Gardnerella vaginalis, Trichomonas vaginalis, Candida albicans, DNA - Swab, Vagina  -     Mycoplasma / Ureaplasma Culture - Swab, Cervix    will check for infections. Will treat as bv. " rto prn. Discussed daily probiotics.     Counseling:  She understands the importance of having the above orders performed in a timely fashion.  She is encouraged to review her results online and/or contact or office if she has questions.     Follow Up:  Return if symptoms worsen or fail to improve.      Heena Stinson, MIGUEL  09/29/2022

## 2022-10-06 LAB
M HOMINIS SPEC QL CULT: POSITIVE
U UREALYTICUM SPEC QL CULT: POSITIVE

## 2022-10-10 ENCOUNTER — TELEPHONE (OUTPATIENT)
Dept: OBSTETRICS AND GYNECOLOGY | Facility: CLINIC | Age: 32
End: 2022-10-10

## 2022-10-10 RX ORDER — ONDANSETRON 4 MG/1
4 TABLET, FILM COATED ORAL EVERY 8 HOURS PRN
Qty: 30 TABLET | Refills: 0 | OUTPATIENT
Start: 2022-10-10 | End: 2023-01-03

## 2022-10-10 RX ORDER — DOXYCYCLINE HYCLATE 100 MG/1
100 CAPSULE ORAL 2 TIMES DAILY
Qty: 20 CAPSULE | Refills: 0 | Status: SHIPPED | OUTPATIENT
Start: 2022-10-10 | End: 2022-10-20

## 2022-10-10 NOTE — TELEPHONE ENCOUNTER
----- Message from MIGUEL Kaminski sent at 10/10/2022 12:35 PM EDT -----  Please discuss results with pt. Treated at [facility] for bv. Doxy sent today. She was treated two months ago with doxy so I extended the treatment to ten days instead of seven. Needs partner treatment again. Needs slalie in 4-6 weeks. Thanks

## 2022-10-10 NOTE — TELEPHONE ENCOUNTER
Discussed results with pt. Adv RX has been sent to her pharmacy for ureaplasma and mycoplasma. She states the doxycycline gave her a lot of stomach issues last time. She states of course she will take it if there is not any other medication to treat it but wants to know if you have any recommendations on how to possibly prevent the stomach issues. Please advise.

## 2022-10-18 ENCOUNTER — TELEPHONE (OUTPATIENT)
Dept: OBSTETRICS AND GYNECOLOGY | Facility: CLINIC | Age: 32
End: 2022-10-18

## 2022-10-18 PROCEDURE — 87480 CANDIDA DNA DIR PROBE: CPT | Performed by: NURSE PRACTITIONER

## 2022-10-18 PROCEDURE — 87591 N.GONORRHOEAE DNA AMP PROB: CPT | Performed by: NURSE PRACTITIONER

## 2022-10-18 PROCEDURE — 87660 TRICHOMONAS VAGIN DIR PROBE: CPT | Performed by: NURSE PRACTITIONER

## 2022-10-18 PROCEDURE — 87491 CHLMYD TRACH DNA AMP PROBE: CPT | Performed by: NURSE PRACTITIONER

## 2022-10-18 PROCEDURE — 87510 GARDNER VAG DNA DIR PROBE: CPT | Performed by: NURSE PRACTITIONER

## 2022-10-18 RX ORDER — FLUCONAZOLE 150 MG/1
150 TABLET ORAL
Qty: 2 TABLET | Refills: 0 | Status: SHIPPED | OUTPATIENT
Start: 2022-10-18 | End: 2022-10-22

## 2022-10-18 NOTE — TELEPHONE ENCOUNTER
Caller: Gisselle White    Relationship: Self    Best call back number: [phone]    What medication are you requesting: SOMETHING FOR A YEAST INFECTION - RESULT OF ANTIBIOTICS PREVIOUSLY GIVEN BY ISELA CAMPBELL    What are your current symptoms: ITCHING, BURNING & DISCHARGE    How long have you been experiencing symptoms: 4 DAYS    Have you had these symptoms before:    [x] Yes  [] No    Have you been treated for these symptoms before:   [x] Yes  [] No    If a prescription is needed, [facility]     Additional notes:PT HAS HAD YEAST INFECTIONS IN THE PAST WHEN ON ANTIBIOTICS.  PLEASE CALL PT IF ANY QUESTIONS.

## 2022-10-19 ENCOUNTER — TELEPHONE (OUTPATIENT)
Dept: URGENT CARE | Facility: CLINIC | Age: 32
End: 2022-10-19

## 2022-10-19 NOTE — TELEPHONE ENCOUNTER
Called patient, results reviewed, patient already prescribed Diflucan by PCP for her symptoms, just wanted to make sure that was what was causing her symptoms.  Take Diflucan as prescribed and follow-up with PCP/GYN if symptoms worsen or persist.  Patient verbalized understanding.

## 2022-10-25 ENCOUNTER — TELEPHONE (OUTPATIENT)
Dept: OBSTETRICS AND GYNECOLOGY | Facility: CLINIC | Age: 32
End: 2022-10-25

## 2022-10-25 NOTE — TELEPHONE ENCOUNTER
Caller: Gisselle White    Relationship to patient: Self    Best call back number: [phone]    Patient is needing: PT NEEDS WANTS TO COME IN TO GIVE URINE SAMPLE, POSSIBLE UTI

## 2023-01-03 PROCEDURE — 87491 CHLMYD TRACH DNA AMP PROBE: CPT | Performed by: FAMILY MEDICINE

## 2023-01-03 PROCEDURE — 87510 GARDNER VAG DNA DIR PROBE: CPT | Performed by: FAMILY MEDICINE

## 2023-01-03 PROCEDURE — 87480 CANDIDA DNA DIR PROBE: CPT | Performed by: FAMILY MEDICINE

## 2023-01-03 PROCEDURE — 87591 N.GONORRHOEAE DNA AMP PROB: CPT | Performed by: FAMILY MEDICINE

## 2023-01-03 PROCEDURE — 87660 TRICHOMONAS VAGIN DIR PROBE: CPT | Performed by: FAMILY MEDICINE

## 2023-01-04 ENCOUNTER — TELEPHONE (OUTPATIENT)
Dept: URGENT CARE | Facility: CLINIC | Age: 33
End: 2023-01-04
Payer: OTHER GOVERNMENT

## 2023-01-04 DIAGNOSIS — B37.31 VAGINAL YEAST INFECTION: ICD-10-CM

## 2023-01-04 DIAGNOSIS — N76.0 BACTERIAL VAGINOSIS: Primary | ICD-10-CM

## 2023-01-04 DIAGNOSIS — B96.89 BACTERIAL VAGINOSIS: Primary | ICD-10-CM

## 2023-01-04 RX ORDER — METRONIDAZOLE 500 MG/1
500 TABLET ORAL 2 TIMES DAILY
Qty: 14 TABLET | Refills: 0 | Status: SHIPPED | OUTPATIENT
Start: 2023-01-04 | End: 2023-01-11

## 2023-01-04 RX ORDER — FLUCONAZOLE 150 MG/1
150 TABLET ORAL ONCE
Qty: 1 TABLET | Refills: 0 | Status: SHIPPED | OUTPATIENT
Start: 2023-01-04 | End: 2023-01-04

## 2023-01-04 NOTE — TELEPHONE ENCOUNTER
----- Message from MIGUEL New sent at 1/4/2023  9:49 AM EST -----  Please notify patient of positive bacterial vaginosis and yeast infection.  I did call and leave a message for her to return my call.  I have already sent over prescriptions to her preferred pharmacy.  Please let her know that she should avoid alcohol during treatment with Flagyl and for 3 days after completion.

## 2023-01-04 NOTE — TELEPHONE ENCOUNTER
I did leave a message to return my call.  She was positive for bacterial vaginosis and a yeast infection.  I have already sent prescriptions over to the pharmacy on file.

## 2023-01-06 ENCOUNTER — TELEPHONE (OUTPATIENT)
Dept: URGENT CARE | Facility: CLINIC | Age: 33
End: 2023-01-06
Payer: OTHER GOVERNMENT

## 2023-01-06 ENCOUNTER — TELEPHONE (OUTPATIENT)
Dept: FAMILY MEDICINE CLINIC | Facility: CLINIC | Age: 33
End: 2023-01-06
Payer: OTHER GOVERNMENT

## 2023-01-06 NOTE — TELEPHONE ENCOUNTER
Caller: Gisselle White    Relationship: Self    Best call back number: [phone]    What medication are you requesting: DIFLUCAN     What are your current symptoms: YEAST INFECTION     How long have you been experiencing symptoms:   5 DAYS   Have you had these symptoms before:    [x] Yes  [] No    Have you been treated for these symptoms before:   [x] Yes  [] No    If a prescription is needed, what is your preferred pharmacy and phone number:  [id number] - [place], [place] - [address] AT [facility] [phone] [facility] [phone]      Additional notes: PATIENT TESTED POSITIVE FOR A YEAST INFECTION AT [facility] ON 1.3.23 AND WAS GIVEN ONE DOSE OF DIFLUCAN THAT WAS TAKEN ON THE FOURTH. SYMPTOMS HAVE BECOME WORSE TODAY 1.6.23. PLEASE CALL AND ADVISE.

## 2023-01-09 ENCOUNTER — OFFICE VISIT (OUTPATIENT)
Dept: FAMILY MEDICINE CLINIC | Facility: CLINIC | Age: 33
End: 2023-01-09
Payer: OTHER GOVERNMENT

## 2023-01-09 DIAGNOSIS — B37.31 YEAST VAGINITIS: Primary | ICD-10-CM

## 2023-01-09 PROCEDURE — 99213 OFFICE O/P EST LOW 20 MIN: CPT | Performed by: NURSE PRACTITIONER

## 2023-01-09 RX ORDER — FLUCONAZOLE 150 MG/1
TABLET ORAL
Qty: 2 TABLET | Refills: 0 | Status: SHIPPED | OUTPATIENT
Start: 2023-01-09 | End: 2023-03-23

## 2023-01-09 NOTE — PROGRESS NOTES
"Chief Complaint  Vaginal Itching    SUBJECTIVE  Gisselle White presents to [facility]     Pt went to  on 01/03/23 , states dx with yeast and BV.  Patient did not start the Flagyl until a few days later, still taking it currently.  Was only given 1 Diflucan, symptoms not resolved.  States most bothersome symptom is itching.    History of Present Illness  Past Medical History:   Diagnosis Date   • Condition not found     HERNIA   • Hemorrhoids       Family History   Problem Relation Age of Onset   • Breast cancer Mother    • Diabetes Mother    • Heart disease Other       History reviewed. No pertinent surgical history.     Current Outpatient Medications:   •  ferrous sulfate 325 (65 FE) MG tablet, Take 1 tablet by mouth Daily With Breakfast., Disp: 90 tablet, Rfl: 1  •  fluconazole (Diflucan) 150 MG tablet, 1 tab PO now, repeat in 72hr, Disp: 2 tablet, Rfl: 0    Current Facility-Administered Medications:   •  Paragard Intrauterine Copper IUD, , Intrauterine, Once, Kadeem Lo, APRN    OBJECTIVE  Vital Signs:   LMP 12/21/2022 (Approximate)    Estimated body mass index is 24.45 kg/m² as calculated from the following:    Height as of 1/3/23: 167.6 cm (66\").    Weight as of 1/3/23: 68.7 kg (151 lb 8 oz).     Wt Readings from Last 3 Encounters:   01/03/23 68.7 kg (151 lb 8 oz)   10/18/22 72.6 kg (160 lb)   09/29/22 72.6 kg (160 lb)     BP Readings from Last 3 Encounters:   01/03/23 110/73   10/18/22 113/71   09/29/22 94/63       Physical Exam  Vitals reviewed.   Constitutional:       Appearance: Normal appearance. She is well-developed.   HENT:      Head: Normocephalic and atraumatic.      Right Ear: External ear normal.      Left Ear: External ear normal.   Eyes:      Conjunctiva/sclera: Conjunctivae normal.      Pupils: Pupils are equal, round, and reactive to light.   Cardiovascular:      Rate and Rhythm: Normal rate and regular rhythm.      Heart sounds: No murmur " heard.    No friction rub. No gallop.   Pulmonary:      Effort: Pulmonary effort is normal.      Breath sounds: Normal breath sounds. No wheezing or rhonchi.   Abdominal:      General: Abdomen is flat. There is no distension.      Palpations: Abdomen is soft.      Tenderness: There is no abdominal tenderness.   Skin:     General: Skin is warm and dry.   Neurological:      Mental Status: She is alert and oriented to person, place, and time.      Cranial Nerves: No cranial nerve deficit.   Psychiatric:         Mood and Affect: Mood and affect normal.         Behavior: Behavior normal.         Thought Content: Thought content normal.         Judgment: Judgment normal.          Result Review        No Images in the past 120 days found..     The above data has been reviewed by MIGUEL Welsh 01/09/2023 10:07 EST.          Patient Care Team:  Elaine Young APRN as PCP - General (Nurse Practitioner)    BMI is within normal parameters. No other follow-up for BMI required.       ASSESSMENT & PLAN    Diagnoses and all orders for this visit:    1. Yeast vaginitis (Primary)  -     fluconazole (Diflucan) 150 MG tablet; 1 tab PO now, repeat in 72hr  Dispense: 2 tablet; Refill: 0    If symptoms not 100% resolved after completion of treatment, follow-up for reevaluation    Tobacco Use: Low Risk    • Smoking Tobacco Use: Never   • Smokeless Tobacco Use: Never   • Passive Exposure: Not on file       Follow Up     Return if symptoms worsen or fail to improve.        Patient was given instructions and counseling regarding her condition or for health maintenance advice. Please see specific information pulled into the AVS if appropriate.   I have reviewed information obtained and documented by others and I have confirmed the accuracy of this documented note.    MIGUEL Welsh

## 2023-02-07 ENCOUNTER — OFFICE VISIT (OUTPATIENT)
Dept: FAMILY MEDICINE CLINIC | Facility: CLINIC | Age: 33
End: 2023-02-07
Payer: OTHER GOVERNMENT

## 2023-02-07 VITALS
HEIGHT: 66 IN | OXYGEN SATURATION: 100 % | BODY MASS INDEX: 24.43 KG/M2 | SYSTOLIC BLOOD PRESSURE: 104 MMHG | HEART RATE: 66 BPM | DIASTOLIC BLOOD PRESSURE: 63 MMHG | WEIGHT: 152 LBS

## 2023-02-07 DIAGNOSIS — N89.8 VAGINAL DISCHARGE: ICD-10-CM

## 2023-02-07 DIAGNOSIS — D50.9 IRON DEFICIENCY ANEMIA, UNSPECIFIED IRON DEFICIENCY ANEMIA TYPE: Primary | ICD-10-CM

## 2023-02-07 LAB
C TRACH RRNA CVX QL NAA+PROBE: NOT DETECTED
CANDIDA SPECIES: NEGATIVE
GARDNERELLA VAGINALIS: POSITIVE
N GONORRHOEA RRNA SPEC QL NAA+PROBE: NOT DETECTED
T VAGINALIS DNA VAG QL PROBE+SIG AMP: NEGATIVE

## 2023-02-07 PROCEDURE — 87660 TRICHOMONAS VAGIN DIR PROBE: CPT | Performed by: NURSE PRACTITIONER

## 2023-02-07 PROCEDURE — 87510 GARDNER VAG DNA DIR PROBE: CPT | Performed by: NURSE PRACTITIONER

## 2023-02-07 PROCEDURE — 99214 OFFICE O/P EST MOD 30 MIN: CPT | Performed by: NURSE PRACTITIONER

## 2023-02-07 PROCEDURE — 87480 CANDIDA DNA DIR PROBE: CPT | Performed by: NURSE PRACTITIONER

## 2023-02-07 PROCEDURE — 87491 CHLMYD TRACH DNA AMP PROBE: CPT | Performed by: NURSE PRACTITIONER

## 2023-02-07 PROCEDURE — 87591 N.GONORRHOEAE DNA AMP PROB: CPT | Performed by: NURSE PRACTITIONER

## 2023-02-07 NOTE — PROGRESS NOTES
"Chief Complaint  Vaginal Discharge    SUBJECTIVE  Gisselle White presents to [facility]     Pt here today due to vaginal discharge. Pt declines any itching. Onset symptoms last 2-3 days.  Patient states that she does believe her symptoms completely resolved after her most recent treatment for BV, however, she feels like she might be developing a yeast infection again just based on the appearance of her discharge.    Pt unsure of last cycle. Pt does have Paragard IUD.     Patient asking recheck iron levels, previously had iron deficiency anemia, at that time was having a lot of heavy bleeding, states periods have regulated and she quit taking her iron supplement, would like to recheck levels today.    History of Present Illness  Past Medical History:   Diagnosis Date   • Condition not found     HERNIA   • Hemorrhoids       Family History   Problem Relation Age of Onset   • Breast cancer Mother    • Diabetes Mother    • Heart disease Other       History reviewed. No pertinent surgical history.     Current Outpatient Medications:   •  ferrous sulfate 325 (65 FE) MG tablet, Take 1 tablet by mouth Daily With Breakfast., Disp: 90 tablet, Rfl: 1  •  fluconazole (Diflucan) 150 MG tablet, 1 tab PO now, repeat in 72hr, Disp: 2 tablet, Rfl: 0    Current Facility-Administered Medications:   •  Paragard Intrauterine Copper IUD, , Intrauterine, Once, Kadeem Lo, APRN    OBJECTIVE  Vital Signs:   /63   Pulse 66   Ht 167.6 cm (66\")   Wt 68.9 kg (152 lb)   SpO2 100%   BMI 24.53 kg/m²    Estimated body mass index is 24.53 kg/m² as calculated from the following:    Height as of this encounter: 167.6 cm (66\").    Weight as of this encounter: 68.9 kg (152 lb).     Wt Readings from Last 3 Encounters:   02/07/23 68.9 kg (152 lb)   01/03/23 68.7 kg (151 lb 8 oz)   10/18/22 72.6 kg (160 lb)     BP Readings from Last 3 Encounters:   02/07/23 104/63   01/03/23 110/73   10/18/22 113/71 "       Physical Exam  Vitals reviewed.   Constitutional:       Appearance: Normal appearance. She is well-developed.   HENT:      Head: Normocephalic and atraumatic.      Right Ear: External ear normal.      Left Ear: External ear normal.   Eyes:      Conjunctiva/sclera: Conjunctivae normal.      Pupils: Pupils are equal, round, and reactive to light.   Cardiovascular:      Rate and Rhythm: Normal rate and regular rhythm.      Heart sounds: No murmur heard.    No friction rub. No gallop.   Pulmonary:      Effort: Pulmonary effort is normal.      Breath sounds: Normal breath sounds. No wheezing or rhonchi.   Genitourinary:     General: Normal vulva.      Pubic Area: No rash.       Labia:         Right: No rash or tenderness.         Left: No rash or tenderness.       Vagina: Vaginal discharge present. No erythema, tenderness or bleeding.      Comments: Small amount of off-white vaginal discharge noted  Skin:     General: Skin is warm and dry.   Neurological:      Mental Status: She is alert and oriented to person, place, and time.      Cranial Nerves: No cranial nerve deficit.   Psychiatric:         Mood and Affect: Mood and affect normal.         Behavior: Behavior normal.         Thought Content: Thought content normal.         Judgment: Judgment normal.          Result Review        No Images in the past 120 days found..     The above data has been reviewed by MIGUEL Welsh 02/07/2023 08:46 EST.          Patient Care Team:  Elaine Young APRN as PCP - General (Nurse Practitioner)    BMI is within normal parameters. No other follow-up for BMI required.       ASSESSMENT & PLAN    Diagnoses and all orders for this visit:    1. Iron deficiency anemia, unspecified iron deficiency anemia type (Primary)  -     Iron Profile; Future  -     CBC w AUTO Differential; Future    2. Vaginal discharge  Comments:  Discussed expected normal vaginal discharge at length, will treat based upon swab results  Orders:  -      Gardnerella vaginalis, Trichomonas vaginalis, Candida albicans, DNA - Swab, Vagina; Future  -     Chlamydia trachomatis, Neisseria gonorrhoeae, PCR - Swab, Cervix; Future         Tobacco Use: Low Risk    • Smoking Tobacco Use: Never   • Smokeless Tobacco Use: Never   • Passive Exposure: Not on file       Follow Up     Return if symptoms worsen or fail to improve.        Patient was given instructions and counseling regarding her condition or for health maintenance advice. Please see specific information pulled into the AVS if appropriate.   I have reviewed information obtained and documented by others and I have confirmed the accuracy of this documented note.    MIGUEL Welsh

## 2023-02-08 DIAGNOSIS — B96.89 VULVOVAGINITIS, BACTERIAL: Primary | ICD-10-CM

## 2023-02-08 DIAGNOSIS — N76.0 VULVOVAGINITIS, BACTERIAL: Primary | ICD-10-CM

## 2023-02-08 RX ORDER — METRONIDAZOLE 500 MG/1
500 TABLET ORAL 2 TIMES DAILY
Qty: 14 TABLET | Refills: 0 | Status: SHIPPED | OUTPATIENT
Start: 2023-02-08 | End: 2023-03-07

## 2023-02-17 ENCOUNTER — TELEPHONE (OUTPATIENT)
Dept: FAMILY MEDICINE CLINIC | Facility: CLINIC | Age: 33
End: 2023-02-17
Payer: OTHER GOVERNMENT

## 2023-03-06 ENCOUNTER — OFFICE VISIT (OUTPATIENT)
Dept: FAMILY MEDICINE CLINIC | Facility: CLINIC | Age: 33
End: 2023-03-06
Payer: OTHER GOVERNMENT

## 2023-03-06 VITALS
SYSTOLIC BLOOD PRESSURE: 101 MMHG | BODY MASS INDEX: 24.11 KG/M2 | OXYGEN SATURATION: 99 % | WEIGHT: 150 LBS | DIASTOLIC BLOOD PRESSURE: 61 MMHG | HEIGHT: 66 IN | HEART RATE: 66 BPM

## 2023-03-06 DIAGNOSIS — N89.8 VAGINAL DISCHARGE: Primary | ICD-10-CM

## 2023-03-06 PROCEDURE — 99214 OFFICE O/P EST MOD 30 MIN: CPT | Performed by: NURSE PRACTITIONER

## 2023-03-06 PROCEDURE — 87491 CHLMYD TRACH DNA AMP PROBE: CPT | Performed by: NURSE PRACTITIONER

## 2023-03-06 PROCEDURE — 87510 GARDNER VAG DNA DIR PROBE: CPT | Performed by: NURSE PRACTITIONER

## 2023-03-06 PROCEDURE — 87591 N.GONORRHOEAE DNA AMP PROB: CPT | Performed by: NURSE PRACTITIONER

## 2023-03-06 PROCEDURE — 87661 TRICHOMONAS VAGINALIS AMPLIF: CPT | Performed by: NURSE PRACTITIONER

## 2023-03-06 PROCEDURE — 87660 TRICHOMONAS VAGIN DIR PROBE: CPT | Performed by: NURSE PRACTITIONER

## 2023-03-06 PROCEDURE — 87480 CANDIDA DNA DIR PROBE: CPT | Performed by: NURSE PRACTITIONER

## 2023-03-06 NOTE — ASSESSMENT & PLAN NOTE
Discussed with patient at length the multiple things that can contribute to frequency of yeast infections and bacterial vaginosis, patient does not use harsh soaps, no douching, cotton underwear, reports that she has tried everything she can think of to decrease the frequency.  Patient reports 1 sexual partner, no condom use, discussed possible irritation from semen, discussed recommend trialing withdrawal to see if this reduces frequency of infections.    Discussed we will treat based on results of swab

## 2023-03-06 NOTE — PROGRESS NOTES
"Chief Complaint  Vaginal Discharge and Vaginal Itching    SUBJECTIVE  Gisselle White presents to [facility]   Patient presents today with complaints of possible yeast infection versus BV.  Patient has a history of frequent infections of both.  Reports for the last couple of days she has had some vaginal discharge and itching.    Patient would like to discuss possibilities as to why she keeps having recurring infections    Patient admits that she often does not complete her medication when treated for BV or misses doses due to it being twice daily for 7 days.  Requests if she is positive for BV again that she has a shorter course of treatment  History of Present Illness  Past Medical History:   Diagnosis Date   • Condition not found     HERNIA   • Hemorrhoids       Family History   Problem Relation Age of Onset   • Breast cancer Mother    • Diabetes Mother    • Heart disease Other       History reviewed. No pertinent surgical history.     Current Outpatient Medications:   •  ferrous sulfate 325 (65 FE) MG tablet, Take 1 tablet by mouth Daily With Breakfast., Disp: 90 tablet, Rfl: 1  •  fluconazole (Diflucan) 150 MG tablet, 1 tab PO now, repeat in 72hr, Disp: 2 tablet, Rfl: 0  •  metroNIDAZOLE (Flagyl) 500 MG tablet, Take 1 tablet by mouth 2 (Two) Times a Day., Disp: 14 tablet, Rfl: 0    Current Facility-Administered Medications:   •  Paragard Intrauterine Copper IUD, , Intrauterine, Once, Kadeem Lo, APRN    OBJECTIVE  Vital Signs:   /61   Pulse 66   Ht 167.6 cm (66\")   Wt 68 kg (150 lb)   SpO2 99%   BMI 24.21 kg/m²    Estimated body mass index is 24.21 kg/m² as calculated from the following:    Height as of this encounter: 167.6 cm (66\").    Weight as of this encounter: 68 kg (150 lb).     Wt Readings from Last 3 Encounters:   03/06/23 68 kg (150 lb)   02/07/23 68.9 kg (152 lb)   01/03/23 68.7 kg (151 lb 8 oz)     BP Readings from Last 3 Encounters:   03/06/23 " 101/61   02/07/23 104/63   01/03/23 110/73       Physical Exam  Vitals reviewed.   Constitutional:       Appearance: Normal appearance. She is well-developed.   HENT:      Head: Normocephalic and atraumatic.      Right Ear: External ear normal.      Left Ear: External ear normal.   Eyes:      Conjunctiva/sclera: Conjunctivae normal.      Pupils: Pupils are equal, round, and reactive to light.   Cardiovascular:      Rate and Rhythm: Normal rate and regular rhythm.      Heart sounds: No murmur heard.    No friction rub. No gallop.   Pulmonary:      Effort: Pulmonary effort is normal.      Breath sounds: Normal breath sounds. No wheezing or rhonchi.   Genitourinary:     Vagina: Vaginal discharge present.      Comments: Moderate amount of off-white discharge noted, no erythema  Skin:     General: Skin is warm and dry.   Neurological:      Mental Status: She is alert and oriented to person, place, and time.      Cranial Nerves: No cranial nerve deficit.   Psychiatric:         Mood and Affect: Mood and affect normal.         Behavior: Behavior normal.         Thought Content: Thought content normal.         Judgment: Judgment normal.          Result Review    CMP    CMP 6/21/22   Glucose 80   BUN 11   Creatinine 0.67   eGFR 120.8   Sodium 141   Potassium 4.3   Chloride 107   Calcium 9.6   Total Protein 6.7   Albumin 4.40   Globulin 2.3   Total Bilirubin 0.4   Alkaline Phosphatase 34 (A)   AST (SGOT) 14   ALT (SGPT) 9   Albumin/Globulin Ratio 1.9   BUN/Creatinine Ratio 16.4   Anion Gap 9.0   (A) Abnormal value       Comments are available for some flowsheets but are not being displayed.           CBC    CBC 6/21/22   WBC 5.42   RBC 4.39   Hemoglobin 9.6 (A)   Hematocrit 33.8 (A)   MCV 77.0 (A)   MCH 21.9 (A)   MCHC 28.4 (A)   RDW 14.7   Platelets 309   (A) Abnormal value                TSH    TSH 6/21/22   TSH 1.920             No Images in the past 120 days found..     The above data has been reviewed by Elaine Young  MIGUEL 03/06/2023 09:14 EST.          Patient Care Team:  Elaine Young APRN as PCP - General (Nurse Practitioner)    BMI is within normal parameters. No other follow-up for BMI required.       ASSESSMENT & PLAN     Diagnoses and all orders for this visit:    1. Vaginal discharge (Primary)  Assessment & Plan:  Discussed with patient at length the multiple things that can contribute to frequency of yeast infections and bacterial vaginosis, patient does not use harsh soaps, no douching, cotton underwear, reports that she has tried everything she can think of to decrease the frequency.  Patient reports 1 sexual partner, no condom use, discussed possible irritation from semen, discussed recommend trialing withdrawal to see if this reduces frequency of infections.    Discussed we will treat based on results of swab    Orders:  -     Gardnerella vaginalis, Trichomonas vaginalis, Candida albicans, DNA - Swab, Vagina; Future  -     Chlamydia trachomatis, Neisseria gonorrhoeae, Trichomonas vaginalis, PCR - Swab, Cervix       Tobacco Use: Low Risk    • Smoking Tobacco Use: Never   • Smokeless Tobacco Use: Never   • Passive Exposure: Not on file       Follow Up     Return if symptoms worsen or fail to improve.        Patient was given instructions and counseling regarding her condition or for health maintenance advice. Please see specific information pulled into the AVS if appropriate.   I have reviewed information obtained and documented by others and I have confirmed the accuracy of this documented note.    MIGUEL Welsh

## 2023-03-07 ENCOUNTER — TELEPHONE (OUTPATIENT)
Dept: FAMILY MEDICINE CLINIC | Facility: CLINIC | Age: 33
End: 2023-03-07

## 2023-03-07 ENCOUNTER — TELEPHONE (OUTPATIENT)
Dept: FAMILY MEDICINE CLINIC | Facility: CLINIC | Age: 33
End: 2023-03-07
Payer: OTHER GOVERNMENT

## 2023-03-07 LAB
C TRACH RRNA SPEC QL NAA+PROBE: NORMAL
N GONORRHOEA RRNA SPEC QL NAA+PROBE: NORMAL
T VAGINALIS RRNA SPEC QL NAA+PROBE: NORMAL

## 2023-03-07 RX ORDER — METRONIDAZOLE 7.5 MG/G
GEL VAGINAL
Qty: 70 G | Refills: 0 | Status: SHIPPED | OUTPATIENT
Start: 2023-03-07 | End: 2023-03-23

## 2023-03-07 NOTE — TELEPHONE ENCOUNTER
Caller: Gisselle White    Relationship: Self    Best call back number: [phone]    What is the best time to reach you: ANY     Who are you requesting to speak with (clinical staff, provider,  specific staff member): CLINICAL     What was the call regarding: PATIENT IS WANTING TO KNOW IF SHE CAN SWITCH HER MEDICATION FOR THE BACTERIAL VAGINOSIS FROM THE GEL INSERT TO THE PILL DUE TO HER GETTING READY TO START HER MENSTRUAL CYCLE. PLEASE ADVISE.     Do you require a callback: YES

## 2023-03-08 NOTE — TELEPHONE ENCOUNTER
Yes we can change the prescription, please call the pharmacy and ensure that the MetroGel has not already been picked up.  Cancel that prescription and I will send in the tablets

## 2023-03-21 ENCOUNTER — TELEPHONE (OUTPATIENT)
Dept: FAMILY MEDICINE CLINIC | Facility: CLINIC | Age: 33
End: 2023-03-21
Payer: OTHER GOVERNMENT

## 2023-03-23 ENCOUNTER — LAB (OUTPATIENT)
Dept: LAB | Facility: HOSPITAL | Age: 33
End: 2023-03-23
Payer: OTHER GOVERNMENT

## 2023-03-23 ENCOUNTER — OFFICE VISIT (OUTPATIENT)
Dept: FAMILY MEDICINE CLINIC | Facility: CLINIC | Age: 33
End: 2023-03-23
Payer: OTHER GOVERNMENT

## 2023-03-23 VITALS
HEIGHT: 66 IN | BODY MASS INDEX: 24.59 KG/M2 | WEIGHT: 153 LBS | DIASTOLIC BLOOD PRESSURE: 82 MMHG | OXYGEN SATURATION: 100 % | HEART RATE: 84 BPM | SYSTOLIC BLOOD PRESSURE: 114 MMHG

## 2023-03-23 DIAGNOSIS — N89.8 VAGINAL DISCHARGE: ICD-10-CM

## 2023-03-23 DIAGNOSIS — J02.9 SORE THROAT: Primary | ICD-10-CM

## 2023-03-23 DIAGNOSIS — D50.9 IRON DEFICIENCY ANEMIA, UNSPECIFIED IRON DEFICIENCY ANEMIA TYPE: ICD-10-CM

## 2023-03-23 LAB
CANDIDA SPECIES: POSITIVE
EXPIRATION DATE: NORMAL
GARDNERELLA VAGINALIS: NEGATIVE
INTERNAL CONTROL: NORMAL
IRON 24H UR-MRATE: 21 MCG/DL (ref 37–145)
IRON SATN MFR SERPL: 4 % (ref 20–50)
Lab: NORMAL
S PYO AG THROAT QL: NEGATIVE
T VAGINALIS DNA VAG QL PROBE+SIG AMP: NEGATIVE
TIBC SERPL-MCNC: 556 MCG/DL (ref 298–536)
TRANSFERRIN SERPL-MCNC: 373 MG/DL (ref 200–360)

## 2023-03-23 PROCEDURE — 87510 GARDNER VAG DNA DIR PROBE: CPT

## 2023-03-23 PROCEDURE — 36415 COLL VENOUS BLD VENIPUNCTURE: CPT

## 2023-03-23 PROCEDURE — 87480 CANDIDA DNA DIR PROBE: CPT

## 2023-03-23 PROCEDURE — 85025 COMPLETE CBC W/AUTO DIFF WBC: CPT

## 2023-03-23 PROCEDURE — 83540 ASSAY OF IRON: CPT

## 2023-03-23 PROCEDURE — 84466 ASSAY OF TRANSFERRIN: CPT

## 2023-03-23 PROCEDURE — 87660 TRICHOMONAS VAGIN DIR PROBE: CPT

## 2023-03-23 RX ORDER — FLUCONAZOLE 150 MG/1
150 TABLET ORAL ONCE
Qty: 1 TABLET | Refills: 0 | Status: SHIPPED | OUTPATIENT
Start: 2023-03-23 | End: 2023-03-23

## 2023-03-23 RX ORDER — FLUCONAZOLE 150 MG/1
150 TABLET ORAL ONCE
Qty: 2 TABLET | Refills: 0 | Status: SHIPPED | OUTPATIENT
Start: 2023-03-23 | End: 2023-03-23

## 2023-03-23 RX ORDER — AMOXICILLIN 500 MG/1
1000 CAPSULE ORAL 2 TIMES DAILY
Qty: 28 CAPSULE | Refills: 0 | Status: SHIPPED | OUTPATIENT
Start: 2023-03-23 | End: 2023-03-30

## 2023-03-23 RX ORDER — METRONIDAZOLE 500 MG/1
500 TABLET ORAL 3 TIMES DAILY
Qty: 21 TABLET | Refills: 0 | Status: SHIPPED | OUTPATIENT
Start: 2023-03-23 | End: 2023-03-30

## 2023-03-23 NOTE — PROGRESS NOTES
Discussed with patient that we would treat based on symptoms and positive exposure.  Her rapid strep a did come back negative.

## 2023-03-23 NOTE — PROGRESS NOTES
Chief Complaint  Sore Throat and Vaginal Itching    SUBJECTIVE  Gisselle White presents to [facility]    History of Present Illness  32-year-old Gisselle White presents today for an acute visit.  She is a patient of Elaine Young she states that several weeks ago she was diagnosed with BV and started on metronidazole vaginal gel.  States that she received her.  Several days later does not feel like it was effective.  Patient notices a yellowish-green itchy discharge.  We discussed that we would do the vaginal swab in office today.    Patient also states that her 4 children have tested positive for strep and that her throat has started hurting today.  Patient does request that if she receives antibiotics that she will need Diflucan to help with yeast infection.    Past Medical History:   Diagnosis Date   • Condition not found     HERNIA   • Hemorrhoids       Family History   Problem Relation Age of Onset   • Breast cancer Mother    • Diabetes Mother    • Heart disease Other       History reviewed. No pertinent surgical history.     Current Outpatient Medications:   •  ferrous sulfate 325 (65 FE) MG tablet, Take 1 tablet by mouth Daily With Breakfast., Disp: 90 tablet, Rfl: 1  •  fluconazole (Diflucan) 150 MG tablet, Take 1 tablet by mouth 1 (One) Time for 1 dose. Take one now and then one on completion of the amoxicillin., Disp: 2 tablet, Rfl: 0  •  amoxicillin (AMOXIL) 500 MG capsule, Take 2 capsules by mouth 2 (Two) Times a Day for 7 days., Disp: 28 capsule, Rfl: 0  •  metroNIDAZOLE (Flagyl) 500 MG tablet, Take 1 tablet by mouth 3 (Three) Times a Day for 7 days., Disp: 21 tablet, Rfl: 0  •  phenol (CHLORASEPTIC) 1.4 % liquid liquid, Apply 1 spray to the mouth or throat Every 2 (Two) Hours As Needed (sore throat)., Disp: 29 mL, Rfl: 0    Current Facility-Administered Medications:   •  Paragard Intrauterine Copper IUD, , Intrauterine, Once, Kadeem Lo,  "APRN    OBJECTIVE  Vital Signs:   /82 (BP Location: Left arm, Patient Position: Sitting, Cuff Size: Large Adult)   Pulse 84   Ht 167.6 cm (66\")   Wt 69.4 kg (153 lb)   SpO2 100%   BMI 24.69 kg/m²    Estimated body mass index is 24.69 kg/m² as calculated from the following:    Height as of this encounter: 167.6 cm (66\").    Weight as of this encounter: 69.4 kg (153 lb).     Wt Readings from Last 3 Encounters:   03/23/23 69.4 kg (153 lb)   03/06/23 68 kg (150 lb)   02/07/23 68.9 kg (152 lb)     BP Readings from Last 3 Encounters:   03/23/23 114/82   03/06/23 101/61   02/07/23 104/63       Physical Exam  Vitals and nursing note reviewed.   Constitutional:       Appearance: Normal appearance.   HENT:      Head: Normocephalic and atraumatic.      Nose: Nose normal.      Mouth/Throat:      Mouth: Mucous membranes are moist.      Pharynx: Posterior oropharyngeal erythema present.      Tonsils: 2+ on the right. 2+ on the left.   Eyes:      Conjunctiva/sclera: Conjunctivae normal.      Pupils: Pupils are equal, round, and reactive to light.   Cardiovascular:      Rate and Rhythm: Normal rate and regular rhythm.      Pulses: Normal pulses.      Heart sounds: Normal heart sounds.   Pulmonary:      Effort: Pulmonary effort is normal.      Breath sounds: Normal breath sounds.   Abdominal:      General: Abdomen is flat.      Palpations: Abdomen is soft.   Genitourinary:     Vagina: Vaginal discharge present.   Musculoskeletal:         General: Normal range of motion.      Cervical back: Normal range of motion and neck supple.   Skin:     General: Skin is warm and dry.   Neurological:      General: No focal deficit present.      Mental Status: She is alert and oriented to person, place, and time. Mental status is at baseline.   Psychiatric:         Mood and Affect: Mood normal.         Behavior: Behavior normal.         Thought Content: Thought content normal.         Judgment: Judgment normal.                  Patient " Care Team:  Elaine Young APRN as PCP - General (Nurse Practitioner)    BMI is within normal parameters. No other follow-up for BMI required.       ASSESSMENT & PLAN    Diagnoses and all orders for this visit:    1. Sore throat (Primary)  Comments:  Treating patient based on symptoms and exposure to strep.  With amoxicillin for 7 days.  Diflucan for yeast infection due to antibiotics.    Orders:  -     POCT rapid strep A    2. Vaginal discharge  Comments:  Have ordered patient 7-day course of metrobudazole tablets.  Orders:  -     Gardnerella vaginalis, Trichomonas vaginalis, Candida albicans, DNA - Swab, Vagina; Future    Other orders  -     metroNIDAZOLE (Flagyl) 500 MG tablet; Take 1 tablet by mouth 3 (Three) Times a Day for 7 days.  Dispense: 21 tablet; Refill: 0  -     Discontinue: fluconazole (Diflucan) 150 MG tablet; Take 1 tablet by mouth 1 (One) Time for 1 dose.  Dispense: 1 tablet; Refill: 0  -     amoxicillin (AMOXIL) 500 MG capsule; Take 2 capsules by mouth 2 (Two) Times a Day for 7 days.  Dispense: 28 capsule; Refill: 0  -     phenol (CHLORASEPTIC) 1.4 % liquid liquid; Apply 1 spray to the mouth or throat Every 2 (Two) Hours As Needed (sore throat).  Dispense: 29 mL; Refill: 0  -     fluconazole (Diflucan) 150 MG tablet; Take 1 tablet by mouth 1 (One) Time for 1 dose. Take one now and then one on completion of the amoxicillin.  Dispense: 2 tablet; Refill: 0         Tobacco Use: Low Risk    • Smoking Tobacco Use: Never   • Smokeless Tobacco Use: Never   • Passive Exposure: Not on file       Follow Up     No follow-ups on file.      Patient was given instructions and counseling regarding her condition or for health maintenance advice. Please see specific information pulled into the AVS if appropriate.   I have reviewed information obtained and documented by others and I have confirmed the accuracy of this documented note.    MIGUEL Aparicio

## 2023-03-24 ENCOUNTER — TELEPHONE (OUTPATIENT)
Dept: FAMILY MEDICINE CLINIC | Facility: CLINIC | Age: 33
End: 2023-03-24
Payer: OTHER GOVERNMENT

## 2023-03-24 LAB
BASOPHILS # BLD AUTO: 0.03 10*3/MM3 (ref 0–0.2)
BASOPHILS NFR BLD AUTO: 0.4 % (ref 0–1.5)
DEPRECATED RDW RBC AUTO: 41.6 FL (ref 37–54)
EOSINOPHIL # BLD AUTO: 0.19 10*3/MM3 (ref 0–0.4)
EOSINOPHIL NFR BLD AUTO: 2.3 % (ref 0.3–6.2)
ERYTHROCYTE [DISTWIDTH] IN BLOOD BY AUTOMATED COUNT: 14.7 % (ref 12.3–15.4)
HCT VFR BLD AUTO: 34.4 % (ref 34–46.6)
HGB BLD-MCNC: 10.2 G/DL (ref 12–15.9)
IMM GRANULOCYTES # BLD AUTO: 0.01 10*3/MM3 (ref 0–0.05)
IMM GRANULOCYTES NFR BLD AUTO: 0.1 % (ref 0–0.5)
LYMPHOCYTES # BLD AUTO: 2.39 10*3/MM3 (ref 0.7–3.1)
LYMPHOCYTES NFR BLD AUTO: 28.6 % (ref 19.6–45.3)
MCH RBC QN AUTO: 23.3 PG (ref 26.6–33)
MCHC RBC AUTO-ENTMCNC: 29.7 G/DL (ref 31.5–35.7)
MCV RBC AUTO: 78.5 FL (ref 79–97)
MONOCYTES # BLD AUTO: 0.52 10*3/MM3 (ref 0.1–0.9)
MONOCYTES NFR BLD AUTO: 6.2 % (ref 5–12)
NEUTROPHILS NFR BLD AUTO: 5.23 10*3/MM3 (ref 1.7–7)
NEUTROPHILS NFR BLD AUTO: 62.4 % (ref 42.7–76)
NRBC BLD AUTO-RTO: 0.1 /100 WBC (ref 0–0.2)
PLATELET # BLD AUTO: 294 10*3/MM3 (ref 140–450)
PMV BLD AUTO: 10.1 FL (ref 6–12)
RBC # BLD AUTO: 4.38 10*6/MM3 (ref 3.77–5.28)
WBC NRBC COR # BLD: 8.37 10*3/MM3 (ref 3.4–10.8)

## 2023-03-24 NOTE — TELEPHONE ENCOUNTER
Patient saw her iron panel on MyChart and was wondering if you have a better way to take her Iron than a pill.  She said she was not  good at remembering to take it

## 2023-03-27 ENCOUNTER — TELEPHONE (OUTPATIENT)
Dept: FAMILY MEDICINE CLINIC | Facility: CLINIC | Age: 33
End: 2023-03-27
Payer: OTHER GOVERNMENT

## 2023-03-27 DIAGNOSIS — D50.9 IRON DEFICIENCY ANEMIA, UNSPECIFIED IRON DEFICIENCY ANEMIA TYPE: Primary | ICD-10-CM

## 2023-03-27 NOTE — TELEPHONE ENCOUNTER
Pt wanted to know what her options were besides taking oral iron because pt said that she doesn't remember to take meds daily. Pt was advised to different reminders to help w taking daily meds

## 2023-07-18 ENCOUNTER — TELEPHONE (OUTPATIENT)
Dept: FAMILY MEDICINE CLINIC | Facility: CLINIC | Age: 33
End: 2023-07-18

## 2023-07-18 NOTE — TELEPHONE ENCOUNTER
Caller: Gisselle White    Relationship to patient: Self    Best call back number: [phone]     Patient is needing: PATIENT CALLED IN FOR AN APPOINTMENT AND FIRST AVAILABLE IS 8.3.23. PATIENT WAS SCHEDULED BUT SHE STATES SHE IS NOT ABLE TO WAIT THAT LONG TO BE SEEN AND WANTS TO BE MOVED UP IF POSSIBLE.    PATIENT ASKS FOR A CALL BACK TO [facility]ISE IF NOT ABLE TO BE SEEN SOONER SHE WILL GO TO URGENT CARE OR SOMEWHERE ELSE.  LEAVE DETAILED MESSAGE IF NO ANSWER.

## 2023-07-25 PROCEDURE — 87480 CANDIDA DNA DIR PROBE: CPT | Performed by: FAMILY MEDICINE

## 2023-07-25 PROCEDURE — 87591 N.GONORRHOEAE DNA AMP PROB: CPT | Performed by: FAMILY MEDICINE

## 2023-07-25 PROCEDURE — 87660 TRICHOMONAS VAGIN DIR PROBE: CPT | Performed by: FAMILY MEDICINE

## 2023-07-25 PROCEDURE — 87510 GARDNER VAG DNA DIR PROBE: CPT | Performed by: FAMILY MEDICINE

## 2023-07-25 PROCEDURE — 87491 CHLMYD TRACH DNA AMP PROBE: CPT | Performed by: FAMILY MEDICINE

## 2023-07-26 ENCOUNTER — TELEPHONE (OUTPATIENT)
Dept: URGENT CARE | Facility: CLINIC | Age: 33
End: 2023-07-26
Payer: OTHER GOVERNMENT

## 2023-07-26 NOTE — TELEPHONE ENCOUNTER
----- Message from MIGUEL Pang sent at 7/26/2023  9:34 AM EDT -----  Please call patient regarding negative vaginal swab and GC/chlamydia.  Recommend follow-up with primary care or OB/GYN if symptoms persist.

## 2024-02-20 ENCOUNTER — OFFICE VISIT (OUTPATIENT)
Dept: FAMILY MEDICINE CLINIC | Facility: CLINIC | Age: 34
End: 2024-02-20
Payer: OTHER GOVERNMENT

## 2024-02-20 VITALS
WEIGHT: 165 LBS | DIASTOLIC BLOOD PRESSURE: 68 MMHG | HEART RATE: 79 BPM | SYSTOLIC BLOOD PRESSURE: 110 MMHG | BODY MASS INDEX: 26.52 KG/M2 | OXYGEN SATURATION: 100 % | HEIGHT: 66 IN

## 2024-02-20 DIAGNOSIS — Z13.220 LIPID SCREENING: ICD-10-CM

## 2024-02-20 DIAGNOSIS — R53.83 FATIGUE, UNSPECIFIED TYPE: ICD-10-CM

## 2024-02-20 DIAGNOSIS — Z13.29 THYROID DISORDER SCREEN: ICD-10-CM

## 2024-02-20 DIAGNOSIS — N94.3 PMS (PREMENSTRUAL SYNDROME): ICD-10-CM

## 2024-02-20 DIAGNOSIS — Z00.00 ANNUAL PHYSICAL EXAM: Primary | ICD-10-CM

## 2024-02-20 DIAGNOSIS — Z01.419 WELL WOMAN EXAM WITH ROUTINE GYNECOLOGICAL EXAM: ICD-10-CM

## 2024-02-20 PROBLEM — F32.81 PMDD (PREMENSTRUAL DYSPHORIC DISORDER): Status: RESOLVED | Noted: 2024-02-20 | Resolved: 2024-02-20

## 2024-02-20 PROBLEM — F32.81 PMDD (PREMENSTRUAL DYSPHORIC DISORDER): Status: ACTIVE | Noted: 2024-02-20

## 2024-02-20 PROCEDURE — G0123 SCREEN CERV/VAG THIN LAYER: HCPCS | Performed by: NURSE PRACTITIONER

## 2024-02-20 NOTE — ASSESSMENT & PLAN NOTE
Discussed with patient at length typical PMS syndrome versus PMDD, discussed various treatment options, patient declines all treatment options at present, she may follow-up if she changes her

## 2024-02-20 NOTE — PROGRESS NOTES
"Subjective   History of Present Illness    Gisselle White is a 33 y.o. female who presents for annual exam.    Obstetric History:  OB History          4    Para   4    Term   4            AB        Living   4         SAB        IAB        Ectopic        Molar        Multiple        Live Births   4               Menstrual History:     Patient's last menstrual period was 2024 (approximate).       Sexual History:         No results found for: \"HPVAPTIMA\"    Current contraception: IUD  History of abnormal Pap smear: no  EVANGELISTA exposure in utero: no  Received Gardasil immunization: no  Perform regular self breast exam: yes - sometimes  Family history of uterine or ovarian cancer: yes - Mother, pre cancer had hysterectomy   Family History of cervical cancer: no  Family History of colon cancer/colon polyps: yes - paternal uncle  Regular self breast exam: yes  History of abnormal mammogram: no  Family history of breast cancer: yes - Mother diagnosed at 62  History of abnormal lipids: no    The following portions of the patient's history were reviewed and updated as appropriate: allergies, current medications, past family history, past medical history, past social history, past surgical history, and problem list.    Review of Systems    A comprehensive review of systems was negative except for: Behavioral/Psych: positive for patient reports mood swings, extreme hunger and overeating, and extreme fatigue in the week prior to starting her period     Objective   Physical Exam    /68   Pulse 79   Ht 167.6 cm (66\")   Wt 74.8 kg (165 lb)   LMP 2024 (Approximate)   SpO2 100%   BMI 26.63 kg/m²   Wt Readings from Last 3 Encounters:   24 74.8 kg (165 lb)   23 70.3 kg (154 lb 14.4 oz)   23 68 kg (150 lb)      BP Readings from Last 3 Encounters:   24 110/68   23 104/65   23 103/66        General:   alert, appears stated age, and cooperative   Heart: regular rate " and rhythm, S1, S2 normal, no murmur, click, rub or gallop   Lungs: clear to auscultation bilaterally   Abdomen: soft, non-tender, without masses or organomegaly   Breast: inspection negative, no nipple discharge or bleeding, no masses or nodularity palpable   Vulva: normal   Vagina: normal mucosa, normal discharge   Cervix: no cervical motion tenderness   Uterus: non-tender   Adnexa: no mass, fullness, tenderness     Assessment & Plan   Diagnoses and all orders for this visit:    1. Annual physical exam (Primary)  -     IGP,rfx Aptima HPV All Pth; Future  -     Comprehensive Metabolic Panel; Future  -     CBC & Differential; Future  -     Lipid Panel; Future  -     TSH Rfx On Abnormal To Free T4; Future  -     IGP,rfx Aptima HPV All Pth    2. Well woman exam with routine gynecological exam  -     IGP,rfx Aptima HPV All Pth; Future  -     Comprehensive Metabolic Panel; Future  -     CBC & Differential; Future  -     Lipid Panel; Future  -     TSH Rfx On Abnormal To Free T4; Future  -     IGP,rfx Aptima HPV All Pth    3. Lipid screening  -     Lipid Panel; Future    4. Thyroid disorder screen  -     TSH Rfx On Abnormal To Free T4; Future    5. Fatigue, unspecified type  -     T4, Free; Future  -     Vitamin B12; Future  -     Folate; Future  -     Iron Profile; Future  -     Ferritin; Future    6. PMS (premenstrual syndrome)  Assessment & Plan:  Discussed with patient at length typical PMS syndrome versus PMDD, discussed various treatment options, patient declines all treatment options at present, she may follow-up if she changes her, may also follow-up with GYN for further evaluation        All questions answered.  Await pap smear results.    The patient is advised to begin progressive daily aerobic exercise program, attempt to lose weight, continue current healthy lifestyle patterns, and return for routine annual checkups.

## 2024-02-20 NOTE — PROGRESS NOTES
"Chief Complaint  No chief complaint on file.    SUBJECTIVE  Gisselle White presents to [facility]     History of Present Illness  Past Medical History:   Diagnosis Date    Condition not found     HERNIA    Hemorrhoids       Family History   Problem Relation Age of Onset    Breast cancer Mother     Diabetes Mother     Heart disease Other       No past surgical history on file.     Current Outpatient Medications:     ferrous sulfate 325 (65 FE) MG tablet, Take 1 tablet by mouth Daily With Breakfast., Disp: 90 tablet, Rfl: 1    fluconazole (Diflucan) 150 MG tablet, Take 1 tablet by mouth Every 72 (Seventy-Two) Hours As Needed (yeast infection symptoms)., Disp: 3 tablet, Rfl: 0    metroNIDAZOLE (FLAGYL) 250 MG tablet, Take 1 tablet by mouth 3 (Three) Times a Day., Disp: , Rfl:     Current Facility-Administered Medications:     Paragard Intrauterine Copper IUD, , Intrauterine, Once, Kadeem Lo APRN    OBJECTIVE  Vital Signs:   There were no vitals taken for this visit.   Estimated body mass index is 25 kg/m² as calculated from the following:    Height as of 7/25/23: 167.6 cm (66\").    Weight as of 7/25/23: 70.3 kg (154 lb 14.4 oz).     Wt Readings from Last 3 Encounters:   07/25/23 70.3 kg (154 lb 14.4 oz)   07/19/23 68 kg (150 lb)   07/05/23 68 kg (150 lb)     BP Readings from Last 3 Encounters:   07/25/23 104/65   07/19/23 103/66   07/05/23 97/70       Physical Exam     Result Review    {[facility] Ambulatory Labs (Optional):54489}    No Images in the past 120 days found..     The above data has been reviewed by Fabiola Miller MA 02/20/2024 08:51 EST.          Patient Care Team:  Elaine Young, APRN as PCP - General (Nurse Practitioner)    {BMI is >= 25 and <30. (Overweight) The following options were offered after discussion;:[id number]}       ASSESSMENT & PLAN    There are no diagnoses linked to this encounter.     Tobacco Use: Low Risk  (7/25/2023)    " Patient History     Smoking Tobacco Use: Never     Smokeless Tobacco Use: Never     Passive Exposure: Not on file       Follow Up     No follow-ups on file.    {Time Spent (Optional):56514}    Patient was given instructions and counseling regarding her condition or for health maintenance advice. Please see specific information pulled into the AVS if appropriate.   I have reviewed information obtained and documented by others and I have confirmed the accuracy of this documented note.    Fabiola Miller MA

## 2024-02-20 NOTE — ASSESSMENT & PLAN NOTE
Discussed with patient at length typical PMS syndrome versus PMDD, discussed various treatment options, patient declines all treatment options at present, she may follow-up if she changes her, may also follow-up with GYN for further evaluation

## 2024-02-21 ENCOUNTER — LAB (OUTPATIENT)
Dept: LAB | Facility: HOSPITAL | Age: 34
End: 2024-02-21
Payer: OTHER GOVERNMENT

## 2024-02-21 DIAGNOSIS — Z13.29 THYROID DISORDER SCREEN: ICD-10-CM

## 2024-02-21 DIAGNOSIS — Z13.220 LIPID SCREENING: ICD-10-CM

## 2024-02-21 DIAGNOSIS — R53.83 FATIGUE, UNSPECIFIED TYPE: ICD-10-CM

## 2024-02-21 DIAGNOSIS — Z00.00 ANNUAL PHYSICAL EXAM: ICD-10-CM

## 2024-02-21 DIAGNOSIS — Z01.419 WELL WOMAN EXAM WITH ROUTINE GYNECOLOGICAL EXAM: ICD-10-CM

## 2024-02-21 LAB
ALBUMIN SERPL-MCNC: 4.3 G/DL (ref 3.5–5.2)
ALBUMIN/GLOB SERPL: 2 G/DL
ALP SERPL-CCNC: 45 U/L (ref 39–117)
ALT SERPL W P-5'-P-CCNC: 12 U/L (ref 1–33)
ANION GAP SERPL CALCULATED.3IONS-SCNC: 11.1 MMOL/L (ref 5–15)
AST SERPL-CCNC: 14 U/L (ref 1–32)
BASOPHILS # BLD AUTO: 0.02 10*3/MM3 (ref 0–0.2)
BASOPHILS NFR BLD AUTO: 0.3 % (ref 0–1.5)
BILIRUB SERPL-MCNC: 0.4 MG/DL (ref 0–1.2)
BUN SERPL-MCNC: 13 MG/DL (ref 6–20)
BUN/CREAT SERPL: 16.7 (ref 7–25)
CALCIUM SPEC-SCNC: 9.2 MG/DL (ref 8.6–10.5)
CHLORIDE SERPL-SCNC: 103 MMOL/L (ref 98–107)
CHOLEST SERPL-MCNC: 207 MG/DL (ref 0–200)
CO2 SERPL-SCNC: 23.9 MMOL/L (ref 22–29)
CREAT SERPL-MCNC: 0.78 MG/DL (ref 0.57–1)
DEPRECATED RDW RBC AUTO: 39.7 FL (ref 37–54)
EGFRCR SERPLBLD CKD-EPI 2021: 104.3 ML/MIN/1.73
EOSINOPHIL # BLD AUTO: 0.22 10*3/MM3 (ref 0–0.4)
EOSINOPHIL NFR BLD AUTO: 3.4 % (ref 0.3–6.2)
ERYTHROCYTE [DISTWIDTH] IN BLOOD BY AUTOMATED COUNT: 14.8 % (ref 12.3–15.4)
FERRITIN SERPL-MCNC: 7 NG/ML (ref 13–150)
FOLATE SERPL-MCNC: 5.17 NG/ML (ref 4.78–24.2)
GLOBULIN UR ELPH-MCNC: 2.2 GM/DL
GLUCOSE SERPL-MCNC: 85 MG/DL (ref 65–99)
HCT VFR BLD AUTO: 35.6 % (ref 34–46.6)
HDLC SERPL-MCNC: 82 MG/DL (ref 40–60)
HGB BLD-MCNC: 10.6 G/DL (ref 12–15.9)
IMM GRANULOCYTES # BLD AUTO: 0.02 10*3/MM3 (ref 0–0.05)
IMM GRANULOCYTES NFR BLD AUTO: 0.3 % (ref 0–0.5)
IRON 24H UR-MRATE: 25 MCG/DL (ref 37–145)
IRON SATN MFR SERPL: 5 % (ref 20–50)
LDLC SERPL CALC-MCNC: 117 MG/DL (ref 0–100)
LDLC/HDLC SERPL: 1.41 {RATIO}
LYMPHOCYTES # BLD AUTO: 1.49 10*3/MM3 (ref 0.7–3.1)
LYMPHOCYTES NFR BLD AUTO: 23.2 % (ref 19.6–45.3)
MCH RBC QN AUTO: 22.6 PG (ref 26.6–33)
MCHC RBC AUTO-ENTMCNC: 29.8 G/DL (ref 31.5–35.7)
MCV RBC AUTO: 75.9 FL (ref 79–97)
MONOCYTES # BLD AUTO: 0.51 10*3/MM3 (ref 0.1–0.9)
MONOCYTES NFR BLD AUTO: 8 % (ref 5–12)
NEUTROPHILS NFR BLD AUTO: 4.15 10*3/MM3 (ref 1.7–7)
NEUTROPHILS NFR BLD AUTO: 64.8 % (ref 42.7–76)
NRBC BLD AUTO-RTO: 0 /100 WBC (ref 0–0.2)
PLATELET # BLD AUTO: 282 10*3/MM3 (ref 140–450)
PMV BLD AUTO: 10.3 FL (ref 6–12)
POTASSIUM SERPL-SCNC: 4.1 MMOL/L (ref 3.5–5.2)
PROT SERPL-MCNC: 6.5 G/DL (ref 6–8.5)
RBC # BLD AUTO: 4.69 10*6/MM3 (ref 3.77–5.28)
SODIUM SERPL-SCNC: 138 MMOL/L (ref 136–145)
T4 FREE SERPL-MCNC: 1.17 NG/DL (ref 0.93–1.7)
TIBC SERPL-MCNC: 496 MCG/DL (ref 298–536)
TRANSFERRIN SERPL-MCNC: 333 MG/DL (ref 200–360)
TRIGL SERPL-MCNC: 46 MG/DL (ref 0–150)
TSH SERPL DL<=0.05 MIU/L-ACNC: 4.16 UIU/ML (ref 0.27–4.2)
VIT B12 BLD-MCNC: 417 PG/ML (ref 211–946)
VLDLC SERPL-MCNC: 8 MG/DL (ref 5–40)
WBC NRBC COR # BLD AUTO: 6.41 10*3/MM3 (ref 3.4–10.8)

## 2024-02-21 PROCEDURE — 80053 COMPREHEN METABOLIC PANEL: CPT

## 2024-02-21 PROCEDURE — 82728 ASSAY OF FERRITIN: CPT

## 2024-02-21 PROCEDURE — 84466 ASSAY OF TRANSFERRIN: CPT

## 2024-02-21 PROCEDURE — 80061 LIPID PANEL: CPT

## 2024-02-21 PROCEDURE — 36415 COLL VENOUS BLD VENIPUNCTURE: CPT

## 2024-02-21 PROCEDURE — 83540 ASSAY OF IRON: CPT

## 2024-02-21 PROCEDURE — 84443 ASSAY THYROID STIM HORMONE: CPT

## 2024-02-21 PROCEDURE — 85025 COMPLETE CBC W/AUTO DIFF WBC: CPT

## 2024-02-21 PROCEDURE — 82746 ASSAY OF FOLIC ACID SERUM: CPT

## 2024-02-21 PROCEDURE — 82607 VITAMIN B-12: CPT

## 2024-02-21 PROCEDURE — 84439 ASSAY OF FREE THYROXINE: CPT

## 2024-02-22 DIAGNOSIS — D64.9 ANEMIA, UNSPECIFIED TYPE: ICD-10-CM

## 2024-02-22 DIAGNOSIS — E61.1 IRON DEFICIENCY: Primary | ICD-10-CM

## 2024-02-22 RX ORDER — FERROUS GLUCONATE 324(38)MG
324 TABLET ORAL 2 TIMES DAILY
Qty: 60 TABLET | Refills: 2 | Status: SHIPPED | OUTPATIENT
Start: 2024-02-22

## 2024-03-12 ENCOUNTER — TELEMEDICINE (OUTPATIENT)
Dept: FAMILY MEDICINE CLINIC | Facility: TELEHEALTH | Age: 34
End: 2024-03-12
Payer: OTHER GOVERNMENT

## 2024-03-12 VITALS — TEMPERATURE: 96.8 F

## 2024-03-12 DIAGNOSIS — R11.0 NAUSEA: Primary | ICD-10-CM

## 2024-03-12 DIAGNOSIS — Z20.828 EXPOSURE TO INFLUENZA: ICD-10-CM

## 2024-03-12 RX ORDER — OSELTAMIVIR PHOSPHATE 75 MG/1
75 CAPSULE ORAL 2 TIMES DAILY
Qty: 10 CAPSULE | Refills: 0 | Status: SHIPPED | OUTPATIENT
Start: 2024-03-12

## 2024-03-12 NOTE — LETTER
March 12, 2024     Patient: Gisselle White   YOB: 1990   Date of Visit: 3/12/2024       To Whom it May Concern:    Gisselle White was seen in my clinic on 3/12/2024. She  may return to work on 3/15/2024.            Sincerely,          MIGUEL Jang        CC: No Recipients

## 2024-03-12 NOTE — PROGRESS NOTES
You have chosen to receive care through a telehealth visit.  Do you consent to use a video/audio connection for your medical care today? Yes     HPI  Gisselle White is a 33 y.o. female  presents with complaint of nausea for several days without fever, vomiting or headache. Her daughter is home with the flu. Symptoms present 2 days.     Review of Systems   Constitutional: Negative.    HENT: Negative.     Respiratory: Negative.     Gastrointestinal:  Positive for nausea. Negative for vomiting.   Musculoskeletal: Negative.    Neurological: Negative.    Hematological: Negative.    Psychiatric/Behavioral: Negative.         Past Medical History:   Diagnosis Date    Condition not found     HERNIA    Hemorrhoids        Family History   Problem Relation Age of Onset    Breast cancer Mother     Diabetes Mother     Heart disease Other        Social History     Socioeconomic History    Marital status:    Tobacco Use    Smoking status: Never     Passive exposure: Never    Smokeless tobacco: Never   Vaping Use    Vaping status: Never Used   Substance and Sexual Activity    Alcohol use: Not Currently    Drug use: Never    Sexual activity: Yes     Partners: Male     Birth control/protection: I.U.D.         Temp 96.8 °F (36 °C)   LMP 02/12/2024 (Approximate)     PHYSICAL EXAM  Physical Exam   Constitutional: She is oriented to person, place, and time. She appears well-developed and well-nourished. She does not have a sickly appearance. She does not appear ill. No distress.   HENT:   Head: Normocephalic and atraumatic.   Right Ear: Hearing normal.   Left Ear: Hearing normal.   Nose: Nose normal.   Mouth/Throat: Mouth/Lips are normal.Mucous membranes are normal. No oropharyngeal exudate or tonsillar abscesses.   TM's dull bilaterally   Pulmonary/Chest: Effort normal.  No respiratory distress. She no audible wheeze...  Neurological: She is alert and oriented to person, place, and time.   Psychiatric: She has a normal mood  and affect.   Vitals reviewed.      Diagnoses and all orders for this visit:    1. Nausea (Primary)  -     BEKA FLU + SARS PCR; Future    2. Exposure to influenza  -     BEKA FLU + SARS PCR; Future    POC test pending.   Clear liquids and rest         FOLLOW-UP  As discussed during visit with [facility], if symptoms worsen or fail to improve, follow-up with PCP/Urgent Care/Emergency Department.    Patient verbalizes understanding of medications, instructions for treatment and follow-up.    MIGUEL Jang  03/12/2024  09:02 EDT    The use of a video visit has been reviewed with the patient and verbal informed consent has been obtained. Myself and Gisselle White participated in this visit. The patient is located in [facility], and I am located in [place], [place]. [employer] and [employer] ([employer])  were utilized.

## 2024-05-20 NOTE — PROGRESS NOTES
Discussed with patient that her hsv test is just slightly elevated and We may need to repeat it in a few months.  2+ B/L/right normal/left normal

## 2024-05-24 ENCOUNTER — LAB (OUTPATIENT)
Dept: LAB | Facility: HOSPITAL | Age: 34
End: 2024-05-24
Payer: OTHER GOVERNMENT

## 2024-05-24 DIAGNOSIS — D64.9 ANEMIA, UNSPECIFIED TYPE: ICD-10-CM

## 2024-05-24 DIAGNOSIS — E61.1 IRON DEFICIENCY: ICD-10-CM

## 2024-05-24 LAB
BASOPHILS # BLD AUTO: 0.01 10*3/MM3 (ref 0–0.2)
BASOPHILS NFR BLD AUTO: 0.2 % (ref 0–1.5)
DEPRECATED RDW RBC AUTO: 49.1 FL (ref 37–54)
EOSINOPHIL # BLD AUTO: 0.21 10*3/MM3 (ref 0–0.4)
EOSINOPHIL NFR BLD AUTO: 4.2 % (ref 0.3–6.2)
ERYTHROCYTE [DISTWIDTH] IN BLOOD BY AUTOMATED COUNT: 17.1 % (ref 12.3–15.4)
HCT VFR BLD AUTO: 36 % (ref 34–46.6)
HGB BLD-MCNC: 10.4 G/DL (ref 12–15.9)
IMM GRANULOCYTES # BLD AUTO: 0.01 10*3/MM3 (ref 0–0.05)
IMM GRANULOCYTES NFR BLD AUTO: 0.2 % (ref 0–0.5)
IRON 24H UR-MRATE: 27 MCG/DL (ref 37–145)
IRON SATN MFR SERPL: 7 % (ref 20–50)
LYMPHOCYTES # BLD AUTO: 1.85 10*3/MM3 (ref 0.7–3.1)
LYMPHOCYTES NFR BLD AUTO: 37.1 % (ref 19.6–45.3)
MCH RBC QN AUTO: 23.4 PG (ref 26.6–33)
MCHC RBC AUTO-ENTMCNC: 28.9 G/DL (ref 31.5–35.7)
MCV RBC AUTO: 80.9 FL (ref 79–97)
MONOCYTES # BLD AUTO: 0.4 10*3/MM3 (ref 0.1–0.9)
MONOCYTES NFR BLD AUTO: 8 % (ref 5–12)
NEUTROPHILS NFR BLD AUTO: 2.5 10*3/MM3 (ref 1.7–7)
NEUTROPHILS NFR BLD AUTO: 50.3 % (ref 42.7–76)
NRBC BLD AUTO-RTO: 0 /100 WBC (ref 0–0.2)
PLATELET # BLD AUTO: 228 10*3/MM3 (ref 140–450)
PMV BLD AUTO: 10.9 FL (ref 6–12)
RBC # BLD AUTO: 4.45 10*6/MM3 (ref 3.77–5.28)
TIBC SERPL-MCNC: 393 MCG/DL (ref 298–536)
TRANSFERRIN SERPL-MCNC: 264 MG/DL (ref 200–360)
WBC NRBC COR # BLD AUTO: 4.98 10*3/MM3 (ref 3.4–10.8)

## 2024-05-24 PROCEDURE — 85025 COMPLETE CBC W/AUTO DIFF WBC: CPT

## 2024-05-24 PROCEDURE — 36415 COLL VENOUS BLD VENIPUNCTURE: CPT

## 2024-05-24 PROCEDURE — 84466 ASSAY OF TRANSFERRIN: CPT

## 2024-05-24 PROCEDURE — 83540 ASSAY OF IRON: CPT

## 2024-05-28 DIAGNOSIS — D50.9 IRON DEFICIENCY ANEMIA, UNSPECIFIED IRON DEFICIENCY ANEMIA TYPE: Primary | ICD-10-CM

## 2024-05-29 ENCOUNTER — TELEPHONE (OUTPATIENT)
Dept: ONCOLOGY | Facility: HOSPITAL | Age: 34
End: 2024-05-29
Payer: OTHER GOVERNMENT

## 2024-05-29 NOTE — TELEPHONE ENCOUNTER
The [facility] received a fax that requires your attention. The document has been indexed to the patient’s chart for your review.      Reason for sending: RECEIVED MEDICAL RECS FOR SCHEDULED APPT    Documents Description: NATTY APPROVAL 05/29/24.> INDEXED IN CHART    Name of Sender: JULIAN BENNETT    Date Indexed: 05/29/24    Notes (if needed): THANKS!

## 2024-06-18 ENCOUNTER — TELEMEDICINE (OUTPATIENT)
Dept: FAMILY MEDICINE CLINIC | Facility: TELEHEALTH | Age: 34
End: 2024-06-18
Payer: OTHER GOVERNMENT

## 2024-06-18 VITALS — WEIGHT: 160 LBS | HEIGHT: 66 IN | BODY MASS INDEX: 25.71 KG/M2

## 2024-06-18 DIAGNOSIS — B37.49 GENITAL CANDIDIASIS: Primary | ICD-10-CM

## 2024-06-18 PROCEDURE — 99213 OFFICE O/P EST LOW 20 MIN: CPT | Performed by: NURSE PRACTITIONER

## 2024-06-18 RX ORDER — FLUCONAZOLE 150 MG/1
TABLET ORAL
Qty: 3 TABLET | Refills: 0 | OUTPATIENT
Start: 2024-06-18 | End: 2024-06-24

## 2024-06-18 NOTE — PROGRESS NOTES
"You have chosen to receive care through a telehealth visit.  Do you consent to use a video/audio connection for your medical care today? Yes     HPI  Gisselle White is a 34 y.o. female  presents with complaint of yeast infection. She reports that she needs treatment for a yeast infection as a result of antibiotics. She is taking her last azithromycin for strep throat today. Her yeast infection symptoms started yesterday. She reports genital itching and white genital discharge. She reports  that she did not take probiotics while taking the antibiotic.    Review of Systems   Constitutional:  Negative for fever.   HENT:  Negative for sore throat.    Gastrointestinal:  Negative for diarrhea and nausea.   Genitourinary:  Positive for vaginal discharge (white discharge).        Genital itching       Past Medical History:   Diagnosis Date    Condition not found     HERNIA    Hemorrhoids        Family History   Problem Relation Age of Onset    Breast cancer Mother     Diabetes Mother     Heart disease Other        Social History     Socioeconomic History    Marital status:    Tobacco Use    Smoking status: Never     Passive exposure: Never    Smokeless tobacco: Never   Vaping Use    Vaping status: Never Used   Substance and Sexual Activity    Alcohol use: Not Currently    Drug use: Never    Sexual activity: Yes     Partners: Male     Birth control/protection: I.U.D.       Gisselle White  reports that she has never smoked. She has never been exposed to tobacco smoke. She has never used smokeless tobacco.       Ht 167.6 cm (66\")   Wt 72.6 kg (160 lb)   LMP 05/31/2024   Breastfeeding No   BMI 25.82 kg/m²     PHYSICAL EXAM  Physical Exam   Constitutional: She is oriented to person, place, and time. She appears well-developed.   HENT:   Head: Normocephalic and atraumatic.   Nose: Nose normal.   Eyes: Lids are normal. Right eye exhibits no discharge. Left eye exhibits no discharge. Right conjunctiva is not " injected. Left conjunctiva is not injected.   Pulmonary/Chest:  No respiratory distress.  Neurological: She is alert and oriented to person, place, and time. No cranial nerve deficit.   Psychiatric: She has a normal mood and affect. Her speech is normal and behavior is normal. Judgment and thought content normal.       Results for orders placed or performed during the hospital encounter of 06/14/24   POC Rapid Strep A    Specimen: Swab   Result Value Ref Range    Rapid Strep A Screen Positive (A)     Internal Control Passed     Lot Number #[id number]     Expiration Date 72,225        Diagnoses and all orders for this visit:    1. Genital candidiasis (Primary)    Other orders  -     fluconazole (Diflucan) 150 MG tablet; 150 mg tabs on days #1, 4 and 7 post antibiotics  Dispense: 3 tablet; Refill: 0    Take fluconazole post antibiotic as directed  Probiotics for one month related to taking antibiotics. The pharmacist can help you with this if needed. Do not take within two hours of antibiotic.    FOLLOW-UP  If symptoms worsen or persist follow up with PCP, ob/gyn or Urgent Care for in person evaluation    Patient verbalizes understanding of medication dosage, comfort measures, instructions for treatment and follow-up.    Pauline Alicea, MIGUEL  06/18/2024  18:07 EDT    The use of a video visit has been reviewed with the patient and verbal informed consent has been obtained. Myself and Gisselle White participated in this visit. The patient is located in [address] [place] [address].    I am located in [place], [place]. [employer] and [employer] Client were utilized. I spent 24 minutes in the patient's chart for this visit.

## 2024-06-24 PROCEDURE — 87510 GARDNER VAG DNA DIR PROBE: CPT | Performed by: NURSE PRACTITIONER

## 2024-06-24 PROCEDURE — 87591 N.GONORRHOEAE DNA AMP PROB: CPT | Performed by: NURSE PRACTITIONER

## 2024-06-24 PROCEDURE — 87480 CANDIDA DNA DIR PROBE: CPT | Performed by: NURSE PRACTITIONER

## 2024-06-24 PROCEDURE — 87660 TRICHOMONAS VAGIN DIR PROBE: CPT | Performed by: NURSE PRACTITIONER

## 2024-06-24 PROCEDURE — 87491 CHLMYD TRACH DNA AMP PROBE: CPT | Performed by: NURSE PRACTITIONER

## 2024-06-25 ENCOUNTER — TELEPHONE (OUTPATIENT)
Dept: URGENT CARE | Facility: CLINIC | Age: 34
End: 2024-06-25
Payer: OTHER GOVERNMENT

## 2024-06-25 NOTE — TELEPHONE ENCOUNTER
----- Message from Minoo Hamm sent at 6/24/2024  4:06 PM EDT -----  Please notify patient of negative vaginal screen results and negative testing for gonorrhea and chlamydia.  All of the test results were negative.  It is possible that the Diflucan that you have taken is working or has cleared up any possible yeast infection.  I recommend monitoring your symptoms closely and follow-up with PCP or GYN if symptoms worsen or persist.

## 2024-07-25 ENCOUNTER — CONSULT (OUTPATIENT)
Dept: ONCOLOGY | Facility: HOSPITAL | Age: 34
End: 2024-07-25
Payer: OTHER GOVERNMENT

## 2024-07-25 VITALS
WEIGHT: 156.6 LBS | HEIGHT: 66 IN | DIASTOLIC BLOOD PRESSURE: 59 MMHG | OXYGEN SATURATION: 98 % | HEART RATE: 65 BPM | RESPIRATION RATE: 18 BRPM | BODY MASS INDEX: 25.17 KG/M2 | SYSTOLIC BLOOD PRESSURE: 101 MMHG | TEMPERATURE: 97.3 F

## 2024-07-25 DIAGNOSIS — K90.49 MALABSORPTION DUE TO INTOLERANCE, NOT ELSEWHERE CLASSIFIED: Primary | ICD-10-CM

## 2024-07-25 DIAGNOSIS — D50.9 IRON DEFICIENCY ANEMIA, UNSPECIFIED IRON DEFICIENCY ANEMIA TYPE: ICD-10-CM

## 2024-07-25 PROCEDURE — 99214 OFFICE O/P EST MOD 30 MIN: CPT | Performed by: NURSE PRACTITIONER

## 2024-07-25 PROCEDURE — G0463 HOSPITAL OUTPT CLINIC VISIT: HCPCS | Performed by: NURSE PRACTITIONER

## 2024-07-25 NOTE — PROGRESS NOTES
Chief Complaint/Reason for Referral:  MADDIE    Elaine Young, AP*  Elaine Young, APRN    Records Obtained:  Records of the patients history including those obtained from  [facility] and patient information  were reviewed and summarized in detail.    Subjective    History of Present Illness    Ms. Gisselle White is a very pleasant 34 year old  female who presents for new patient evaluation for MADDIE. She follows with MIGUEL Lr. Reports menses can sometimes be heavy every now and then, but not always. Notes hemorrhoids but not bleeding. Weight is stable. Has taken oral iron but has not been compliant with taking it as she forgets to take it plus she has constipation associated with taking it.     PMH includes: hemorrhoids, MADDIE. Family history of a mother diagnosed with recurrent breast cancer at age 47. Patient has had anemia since at least June of 2022 in the range of 9.6 to 10.6. MCV has been in the 77 to 81 range. Normal WBC and normal platelet count. Last normal hemoglobin of 12.8 on 8/10/2021. Reports she has had 4 pregnancies. She has had iron deficiency dating back to at June of 2022. Most recent iron studies in May with iron down to 27, ferritin of 7., iron sat of 7%, TIBC of 393.     Oncology/Hematology History    No history exists.       Review of Systems   Constitutional:  Positive for fatigue.   HENT: Negative.     Eyes: Negative.    Respiratory: Negative.     Cardiovascular: Negative.    Gastrointestinal:  Positive for constipation.   Endocrine: Positive for cold intolerance.   Genitourinary: Negative.    Musculoskeletal: Negative.    Skin: Negative.    Allergic/Immunologic: Negative.    Neurological: Negative.    Hematological: Negative.    Psychiatric/Behavioral: Negative.         Current Outpatient Medications on File Prior to Visit   Medication Sig Dispense Refill    ferrous gluconate (FERGON) 324 MG tablet Take 1 tablet by mouth 2 (Two) Times a Day. 60 tablet 2     Current  Facility-Administered Medications on File Prior to Visit   Medication Dose Route Frequency Provider Last Rate Last Admin    Paragard Intrauterine Copper IUD   Intrauterine Once Kadeem Lo, APRN           No Known Allergies  Past Medical History:   Diagnosis Date    Condition not found     HERNIA    Hemorrhoids      History reviewed. No pertinent surgical history.  Social History     Socioeconomic History    Marital status:    Tobacco Use    Smoking status: Never     Passive exposure: Never    Smokeless tobacco: Never   Vaping Use    Vaping status: Never Used   Substance and Sexual Activity    Alcohol use: Not Currently    Drug use: Never    Sexual activity: Yes     Partners: Male     Birth control/protection: I.U.D.     Family History   Problem Relation Age of Onset    Breast cancer Mother     Diabetes Mother     Heart disease Other      Immunization History   Administered Date(s) Administered    Tdap 01/02/2020       Tobacco Use: Low Risk  (7/25/2024)    Patient History     Smoking Tobacco Use: Never     Smokeless Tobacco Use: Never     Passive Exposure: Never       Objective     Physical Exam  Vitals and nursing note reviewed.   Constitutional:       Appearance: Normal appearance. She is normal weight.   HENT:      Head: Normocephalic.      Nose: Nose normal.      Mouth/Throat:      Mouth: Mucous membranes are moist.   Eyes:      Pupils: Pupils are equal, round, and reactive to light.   Cardiovascular:      Rate and Rhythm: Normal rate and regular rhythm.      Pulses: Normal pulses.      Heart sounds: Normal heart sounds. No murmur heard.  Pulmonary:      Effort: Pulmonary effort is normal. No respiratory distress.      Breath sounds: Normal breath sounds.   Abdominal:      General: Bowel sounds are normal.      Palpations: Abdomen is soft.   Musculoskeletal:         General: Normal range of motion.      Cervical back: Normal range of motion and neck supple.   Skin:     General: Skin is warm and dry.  "     Capillary Refill: Capillary refill takes less than 2 seconds.   Neurological:      General: No focal deficit present.      Mental Status: She is alert and oriented to person, place, and time.   Psychiatric:         Mood and Affect: Mood normal.         Behavior: Behavior normal.         Thought Content: Thought content normal.         Judgment: Judgment normal.         Vitals:    07/25/24 0917   BP: 101/59   Pulse: 65   Resp: 18   Temp: 97.3 °F (36.3 °C)   TempSrc: Temporal   SpO2: 98%   Weight: 71 kg (156 lb 9.6 oz)   Height: 167.6 cm (65.98\")   PainSc: 0-No pain       Wt Readings from Last 3 Encounters:   07/25/24 71 kg (156 lb 9.6 oz)   06/24/24 72.8 kg (160 lb 6.4 oz)   06/18/24 72.6 kg (160 lb)               ECOG score: 0         ECOG: (0) Fully Active - Able to Carry On All Pre-disease Performance Without Restriction  Fall Risk Assessment was completed, and patient is at low risk for falls.  PHQ-9 Total Score:         The patient is  experiencing fatigue. Fatigue score: 8    PT/OT Functional Screening: PT fx screen : No needs identified  Speech Functional Screening: Speech fx screen : No needs identified  Rehab to be ordered: Rehab to be ordered : No needs identified        Result Review :  The following data was reviewed by: MIGUEL Fuentes on 07/25/2024:  Lab Results   Component Value Date    HGB 10.4 (L) 05/24/2024    HCT 36.0 05/24/2024    MCV 80.9 05/24/2024     05/24/2024    WBC 4.98 05/24/2024    NEUTROABS 2.50 05/24/2024    LYMPHSABS 1.85 05/24/2024    MONOSABS 0.40 05/24/2024    EOSABS 0.21 05/24/2024    BASOSABS 0.01 05/24/2024     Lab Results   Component Value Date    GLUCOSE 85 02/21/2024    BUN 13 02/21/2024    CREATININE 0.78 02/21/2024     02/21/2024    K 4.1 02/21/2024     02/21/2024    CO2 23.9 02/21/2024    CALCIUM 9.2 02/21/2024    PROTEINTOT 6.5 02/21/2024    ALBUMIN 4.3 02/21/2024    BILITOT 0.4 02/21/2024    ALKPHOS 45 02/21/2024    AST 14 02/21/2024    " "ALT 12 02/21/2024     Lab Results   Component Value Date    FERRITIN 7.00 (L) 02/21/2024    [id number] 417 02/21/2024    FOLATE 5.17 02/21/2024     Lab Results   Component Value Date    IRON 27 (L) 05/24/2024    LABIRON 7 (L) 05/24/2024    TRANSFERRIN 264 05/24/2024    TIBC 393 05/24/2024     Lab Results   Component Value Date    FERRITIN 7.00 (L) 02/21/2024    [id number] 417 02/21/2024    FOLATE 5.17 02/21/2024     No results found for: \"PSA\", \"CEA\", \"AFP\", \"\", \"\"    No Images in the past 120 days found..         Assessment and Plan:  Diagnoses and all orders for this visit:    1. Malabsorption due to intolerance, not elsewhere classified (Primary)  -     Intrinsic Factor Ab  -     Anti-parietal Antibody    2. Iron deficiency anemia, unspecified iron deficiency anemia type  -     CBC & Differential  -     Iron Profile  -     Ferritin  -     Reticulocytes  -     Vitamin B12  -     Folate  -     Peripheral Blood Smear  -     CBC & Differential; Future  -     Ferritin; Future  -     Iron Profile; Future  -     Intrinsic Factor Ab  -     Anti-parietal Antibody    Iron deficiency anemia likely secondary to malabsorption as well as poor compliance with taking oral iron. We discussed IV iron but would like to hold off on any infusions at this time. She inquires today if iron infusions feed cancer and discussed with her that I am not aware of iron infusions causing any type of cancer especially in the setting of classic MADDIE.     Has had low folate before, so will recheck this today. Reports her grandmother has MADDIE as well. Will also check for any pernicious anemia.     We discussed iron rich foods and she was given a list of foods to try to eat and get her levels up through nutrition. Discussed trying to take the ferrous gluconate as well at least 3 days as week.     Repeat labs today and again in 3 months to see if iron studies have improved.     I spent 30 minutes caring for Gisselle on this date of service. This " time includes time spent by me in the following activities:preparing for the visit, reviewing tests, obtaining and/or reviewing a separately obtained history, performing a medically appropriate examination and/or evaluation , counseling and educating the patient/family/caregiver, ordering medications, tests, or procedures, referring and communicating with other health care professionals , documenting information in the medical record, and independently interpreting results and communicating that information with the patient/family/caregiver    Patient Follow Up: 3 months with NP or PA.     Patient was given instructions and counseling regarding her condition or for health maintenance advice. Please see specific information pulled into the AVS if appropriate.     Bridgett Thompson, APRN    7/25/2024    .tob

## 2024-07-31 ENCOUNTER — TELEMEDICINE (OUTPATIENT)
Dept: FAMILY MEDICINE CLINIC | Facility: TELEHEALTH | Age: 34
End: 2024-07-31
Payer: OTHER GOVERNMENT

## 2024-07-31 DIAGNOSIS — A08.4 VIRAL GASTROENTERITIS: Primary | ICD-10-CM

## 2024-07-31 RX ORDER — ONDANSETRON 8 MG/1
8 TABLET, ORALLY DISINTEGRATING ORAL EVERY 8 HOURS PRN
Qty: 15 TABLET | Refills: 0 | Status: SHIPPED | OUTPATIENT
Start: 2024-07-31

## 2024-07-31 NOTE — LETTER
July 31, 2024     Patient: Gisselle White   YOB: 1990   Date of Visit: 7/31/2024       To Whom It May Concern:    It is my medical opinion that Gisselle White may return to work tomorrow on 8/1/2024.           Sincerely,    MIGUEL Gross    CC:   No Recipients

## 2024-07-31 NOTE — PROGRESS NOTES
Subjective   Chief Complaint   Patient presents with    Nausea    Vomiting       Gisselle White is a 34 y.o. female.     History of Present Illness  Pt reports waking up today with nausea and vomiting. Her youngest child attends  and has had a stomach virus.   GI Problem  The primary symptoms include nausea and vomiting. Primary symptoms do not include fever, weight loss, fatigue, abdominal pain, diarrhea, melena, hematemesis, jaundice, hematochezia, dysuria, myalgias, arthralgias or rash. The illness began today. The problem has not changed since onset.  The vomiting began today. Vomiting occurred once. The emesis contains stomach contents.   The illness does not include chills, anorexia, dysphagia, odynophagia, bloating, constipation, tenesmus, back pain or itching.        No Known Allergies    Past Medical History:   Diagnosis Date    Condition not found     HERNIA    Hemorrhoids        History reviewed. No pertinent surgical history.    Social History     Socioeconomic History    Marital status:    Tobacco Use    Smoking status: Never     Passive exposure: Never    Smokeless tobacco: Never   Vaping Use    Vaping status: Never Used   Substance and Sexual Activity    Alcohol use: Not Currently    Drug use: Never    Sexual activity: Yes     Partners: Male     Birth control/protection: I.U.D.       Family History   Problem Relation Age of Onset    Breast cancer Mother     Diabetes Mother     Heart disease Other          Current Outpatient Medications:     ferrous gluconate (FERGON) 324 MG tablet, Take 1 tablet by mouth 2 (Two) Times a Day., Disp: 60 tablet, Rfl: 2    ondansetron ODT (ZOFRAN-ODT) 8 MG disintegrating tablet, Place 1 tablet on the tongue Every 8 (Eight) Hours As Needed for Nausea or Vomiting., Disp: 15 tablet, Rfl: 0    Current Facility-Administered Medications:     Paragard Intrauterine Copper IUD, , Intrauterine, Once, Kadeem Lo, APRN      Review of Systems    Constitutional:  Positive for activity change and appetite change. Negative for chills, diaphoresis, fatigue, fever and unexpected weight loss.   Gastrointestinal:  Positive for nausea and vomiting. Negative for abdominal pain, anorexia, bloating, constipation, diarrhea, dysphagia, hematemesis, hematochezia, jaundice and melena.   Genitourinary:  Negative for dysuria.   Musculoskeletal:  Negative for arthralgias, back pain and myalgias.   Skin:  Negative for itching and rash.        There were no vitals filed for this visit.    Objective   Physical Exam  Constitutional:       General: She is not in acute distress.     Appearance: Normal appearance. She is not ill-appearing, toxic-appearing or diaphoretic.   HENT:      Head: Normocephalic.      Mouth/Throat:      Lips: Pink.      Mouth: Mucous membranes are moist.   Pulmonary:      Effort: Pulmonary effort is normal.   Neurological:      Mental Status: She is alert and oriented to person, place, and time.          Procedures     Assessment & Plan   Diagnoses and all orders for this visit:    1. Viral gastroenteritis (Primary)  -     ondansetron ODT (ZOFRAN-ODT) 8 MG disintegrating tablet; Place 1 tablet on the tongue Every 8 (Eight) Hours As Needed for Nausea or Vomiting.  Dispense: 15 tablet; Refill: 0    Increase fluids with small frequent sips. [place] diet.  Tylenol for pain and/or fever, stay hydrated and rest.        If symptoms worsen or do not improve follow up with your PCP or visit your nearest Urgent Care Center or ER.        PLAN: Discussed dosing, side effects, recommended other symptomatic care.  Patient should follow up with primary care provider, Urgent Care or ER if symptoms worsen, fail to resolve or other symptoms need attention. Patient/family agree to the above.         MIGUEL Gross     The use of a video visit has been reviewed with the patient and verbal informed consent has been obtained. Myself and Gisselle White participated in  this visit. The patient is located at [address] [place] [address]. I am located in [place], [place]. Mychart and Zoom were utilized.        This visit was performed via Telehealth.  This patient has been instructed to follow-up with their primary care provider if their symptoms worsen or the treatment provided does not resolve their illness.

## 2024-07-31 NOTE — PATIENT INSTRUCTIONS
Increase fluids with small frequent sips. [place] diet.  Tylenol for pain and/or fever, stay hydrated and rest.        If symptoms worsen or do not improve follow up with your PCP or visit your nearest Urgent Care Center or ER.

## 2024-08-04 PROBLEM — J02.9 SORE THROAT: Status: ACTIVE | Noted: 2024-08-04

## 2024-08-04 PROCEDURE — 87081 CULTURE SCREEN ONLY: CPT | Performed by: FAMILY MEDICINE

## 2024-08-04 PROCEDURE — 87140 CULTURE TYPE IMMUNOFLUORESC: CPT | Performed by: FAMILY MEDICINE

## 2024-08-04 PROCEDURE — 87110 CHLAMYDIA CULTURE: CPT | Performed by: FAMILY MEDICINE

## 2024-08-07 ENCOUNTER — LAB (OUTPATIENT)
Dept: LAB | Facility: HOSPITAL | Age: 34
End: 2024-08-07
Payer: OTHER GOVERNMENT

## 2024-08-07 LAB
ANISOCYTOSIS BLD QL: ABNORMAL
DEPRECATED RDW RBC AUTO: 46.5 FL (ref 37–54)
ERYTHROCYTE [DISTWIDTH] IN BLOOD BY AUTOMATED COUNT: 15.9 % (ref 12.3–15.4)
FERRITIN SERPL-MCNC: 19.89 NG/ML (ref 13–150)
FOLATE SERPL-MCNC: 8.79 NG/ML (ref 4.78–24.2)
HCT VFR BLD AUTO: 34.4 % (ref 34–46.6)
HGB BLD-MCNC: 10.5 G/DL (ref 12–15.9)
IRON 24H UR-MRATE: 27 MCG/DL (ref 37–145)
IRON SATN MFR SERPL: 6 % (ref 20–50)
LYMPHOCYTES # BLD MANUAL: 2.25 10*3/MM3 (ref 0.7–3.1)
LYMPHOCYTES NFR BLD MANUAL: 6 % (ref 5–12)
MCH RBC QN AUTO: 24.6 PG (ref 26.6–33)
MCHC RBC AUTO-ENTMCNC: 30.5 G/DL (ref 31.5–35.7)
MCV RBC AUTO: 80.8 FL (ref 79–97)
METAMYELOCYTES NFR BLD MANUAL: 2 % (ref 0–0)
MONOCYTES # BLD: 0.37 10*3/MM3 (ref 0.1–0.9)
NEUTROPHILS # BLD AUTO: 3.37 10*3/MM3 (ref 1.7–7)
NEUTROPHILS NFR BLD MANUAL: 54 % (ref 42.7–76)
NRBC SPEC MANUAL: 10 /100 WBC (ref 0–0.2)
PATHOLOGY REVIEW: YES
PLATELET # BLD AUTO: 287 10*3/MM3 (ref 140–450)
PMV BLD AUTO: 10.3 FL (ref 6–12)
POIKILOCYTOSIS BLD QL SMEAR: ABNORMAL
PROMYELOCYTES NFR BLD MANUAL: 2 % (ref 0–0)
RBC # BLD AUTO: 4.26 10*6/MM3 (ref 3.77–5.28)
RETICS # AUTO: 0.04 10*6/MM3 (ref 0.02–0.13)
RETICS/RBC NFR AUTO: 0.92 % (ref 0.7–1.9)
SMALL PLATELETS BLD QL SMEAR: ADEQUATE
TIBC SERPL-MCNC: 477 MCG/DL (ref 298–536)
TRANSFERRIN SERPL-MCNC: 320 MG/DL (ref 200–360)
VARIANT LYMPHS NFR BLD MANUAL: 2 % (ref 0–5)
VARIANT LYMPHS NFR BLD MANUAL: 34 % (ref 19.6–45.3)
VIT B12 BLD-MCNC: 325 PG/ML (ref 211–946)
WBC MORPH BLD: NORMAL
WBC NRBC COR # BLD AUTO: 6.24 10*3/MM3 (ref 3.4–10.8)

## 2024-08-07 PROCEDURE — 82607 VITAMIN B-12: CPT | Performed by: NURSE PRACTITIONER

## 2024-08-07 PROCEDURE — 83516 IMMUNOASSAY NONANTIBODY: CPT | Performed by: NURSE PRACTITIONER

## 2024-08-07 PROCEDURE — 84466 ASSAY OF TRANSFERRIN: CPT | Performed by: NURSE PRACTITIONER

## 2024-08-07 PROCEDURE — 85045 AUTOMATED RETICULOCYTE COUNT: CPT | Performed by: NURSE PRACTITIONER

## 2024-08-07 PROCEDURE — 86340 INTRINSIC FACTOR ANTIBODY: CPT | Performed by: NURSE PRACTITIONER

## 2024-08-07 PROCEDURE — 83540 ASSAY OF IRON: CPT | Performed by: NURSE PRACTITIONER

## 2024-08-07 PROCEDURE — 82746 ASSAY OF FOLIC ACID SERUM: CPT | Performed by: NURSE PRACTITIONER

## 2024-08-07 PROCEDURE — 85007 BL SMEAR W/DIFF WBC COUNT: CPT | Performed by: NURSE PRACTITIONER

## 2024-08-07 PROCEDURE — 82728 ASSAY OF FERRITIN: CPT | Performed by: NURSE PRACTITIONER

## 2024-08-07 PROCEDURE — 85025 COMPLETE CBC W/AUTO DIFF WBC: CPT | Performed by: NURSE PRACTITIONER

## 2024-08-07 PROCEDURE — 36415 COLL VENOUS BLD VENIPUNCTURE: CPT | Performed by: NURSE PRACTITIONER

## 2024-08-08 ENCOUNTER — TELEPHONE (OUTPATIENT)
Dept: ONCOLOGY | Facility: HOSPITAL | Age: 34
End: 2024-08-08

## 2024-08-08 LAB
CYTO UR: NORMAL
LAB AP CASE REPORT: NORMAL
LAB AP CLINICAL INFORMATION: NORMAL
PATH REPORT.FINAL DX SPEC: NORMAL
PATH REPORT.GROSS SPEC: NORMAL

## 2024-08-08 NOTE — TELEPHONE ENCOUNTER
Patient was phoned with lab results.     Discussed iron infusions which she does not wish to do at this time. Will keep taking the oral iron 3 days a week and eating iron rich foods.     Peripheral smear not back yet. Did have a myelocytes / promyelocytes on cbc. Wait for peripheral results.     Denies any fevers, chills. Sore throat recently. Was told was viral infection.     Follow up as scheduled.

## 2024-08-08 NOTE — TELEPHONE ENCOUNTER
Caller: Gisselle White    Relationship: Self    Best call back number: [phone]    What test was performed: LABS     When was the test performed: 8/7/24    Where was the test performed: [facility]

## 2024-08-09 LAB — PCA AB SER-ACNC: 1.6 UNITS (ref 0–20)

## 2024-08-10 LAB — IF BLOCK AB SER-ACNC: 1 AU/ML (ref 0–1.1)

## 2024-10-14 PROBLEM — R30.0 BURNING WITH URINATION: Status: ACTIVE | Noted: 2024-10-14

## 2024-10-14 PROCEDURE — 87491 CHLMYD TRACH DNA AMP PROBE: CPT | Performed by: NURSE PRACTITIONER

## 2024-10-14 PROCEDURE — 87591 N.GONORRHOEAE DNA AMP PROB: CPT | Performed by: NURSE PRACTITIONER

## 2024-10-14 PROCEDURE — 87186 SC STD MICRODIL/AGAR DIL: CPT | Performed by: NURSE PRACTITIONER

## 2024-10-14 PROCEDURE — 87086 URINE CULTURE/COLONY COUNT: CPT | Performed by: NURSE PRACTITIONER

## 2024-10-14 PROCEDURE — 87660 TRICHOMONAS VAGIN DIR PROBE: CPT | Performed by: NURSE PRACTITIONER

## 2024-10-14 PROCEDURE — 87510 GARDNER VAG DNA DIR PROBE: CPT | Performed by: NURSE PRACTITIONER

## 2024-10-14 PROCEDURE — 87480 CANDIDA DNA DIR PROBE: CPT | Performed by: NURSE PRACTITIONER

## 2024-10-14 PROCEDURE — 87088 URINE BACTERIA CULTURE: CPT | Performed by: NURSE PRACTITIONER

## 2024-10-16 ENCOUNTER — TELEPHONE (OUTPATIENT)
Dept: URGENT CARE | Facility: CLINIC | Age: 34
End: 2024-10-16
Payer: OTHER GOVERNMENT

## 2025-01-28 ENCOUNTER — TELEPHONE (OUTPATIENT)
Dept: OBSTETRICS AND GYNECOLOGY | Facility: CLINIC | Age: 35
End: 2025-01-28

## 2025-01-28 ENCOUNTER — TELEPHONE (OUTPATIENT)
Dept: ONCOLOGY | Facility: HOSPITAL | Age: 35
End: 2025-01-28
Payer: OTHER GOVERNMENT

## 2025-01-28 PROCEDURE — 87660 TRICHOMONAS VAGIN DIR PROBE: CPT | Performed by: NURSE PRACTITIONER

## 2025-01-28 PROCEDURE — 87491 CHLMYD TRACH DNA AMP PROBE: CPT | Performed by: NURSE PRACTITIONER

## 2025-01-28 PROCEDURE — 87591 N.GONORRHOEAE DNA AMP PROB: CPT | Performed by: NURSE PRACTITIONER

## 2025-01-28 PROCEDURE — 87480 CANDIDA DNA DIR PROBE: CPT | Performed by: NURSE PRACTITIONER

## 2025-01-28 PROCEDURE — 87510 GARDNER VAG DNA DIR PROBE: CPT | Performed by: NURSE PRACTITIONER

## 2025-01-28 NOTE — TELEPHONE ENCOUNTER
Hub staff attempted to follow warm transfer process and was unsuccessful     Caller: Gisselle White    Relationship to patient: Self    Best call back number: [phone] (home)     Patient is needing: NEEDING TO SCHEDULE IUD CHECK. WAS AT URGENT CARE, THEY WERE UNABLE TO SEE THE STRINGS WHEN DOING A VAGINAL EXAM. DID XRAY BUT DIDN'T LOOK LIKE IT WAS PLACED CORRECTLY. NEEDING US TO DETERMINE PROPER PLACEMENT.

## 2025-01-28 NOTE — TELEPHONE ENCOUNTER
Spoke w/pt and she will call back once she has her new work schedule.  Pt will still get labs at [facility]

## 2025-03-25 ENCOUNTER — LAB (OUTPATIENT)
Dept: LAB | Facility: HOSPITAL | Age: 35
End: 2025-03-25
Payer: OTHER GOVERNMENT

## 2025-03-25 DIAGNOSIS — D50.9 IRON DEFICIENCY ANEMIA, UNSPECIFIED IRON DEFICIENCY ANEMIA TYPE: ICD-10-CM

## 2025-03-25 LAB
BASOPHILS # BLD AUTO: 0.02 10*3/MM3 (ref 0–0.2)
BASOPHILS NFR BLD AUTO: 0.4 % (ref 0–1.5)
DEPRECATED RDW RBC AUTO: 47.1 FL (ref 37–54)
EOSINOPHIL # BLD AUTO: 0.16 10*3/MM3 (ref 0–0.4)
EOSINOPHIL NFR BLD AUTO: 3 % (ref 0.3–6.2)
ERYTHROCYTE [DISTWIDTH] IN BLOOD BY AUTOMATED COUNT: 16.6 % (ref 12.3–15.4)
FERRITIN SERPL-MCNC: 12.69 NG/ML (ref 13–150)
HCT VFR BLD AUTO: 35.1 % (ref 34–46.6)
HGB BLD-MCNC: 10.4 G/DL (ref 12–15.9)
IMM GRANULOCYTES # BLD AUTO: 0.01 10*3/MM3 (ref 0–0.05)
IMM GRANULOCYTES NFR BLD AUTO: 0.2 % (ref 0–0.5)
IRON 24H UR-MRATE: 30 MCG/DL (ref 37–145)
IRON SATN MFR SERPL: 6 % (ref 20–50)
LYMPHOCYTES # BLD AUTO: 2.13 10*3/MM3 (ref 0.7–3.1)
LYMPHOCYTES NFR BLD AUTO: 40 % (ref 19.6–45.3)
MCH RBC QN AUTO: 23.1 PG (ref 26.6–33)
MCHC RBC AUTO-ENTMCNC: 29.6 G/DL (ref 31.5–35.7)
MCV RBC AUTO: 78 FL (ref 79–97)
MONOCYTES # BLD AUTO: 0.31 10*3/MM3 (ref 0.1–0.9)
MONOCYTES NFR BLD AUTO: 5.8 % (ref 5–12)
NEUTROPHILS NFR BLD AUTO: 2.69 10*3/MM3 (ref 1.7–7)
NEUTROPHILS NFR BLD AUTO: 50.6 % (ref 42.7–76)
NRBC BLD AUTO-RTO: 0 /100 WBC (ref 0–0.2)
PLATELET # BLD AUTO: 275 10*3/MM3 (ref 140–450)
PMV BLD AUTO: 10.7 FL (ref 6–12)
RBC # BLD AUTO: 4.5 10*6/MM3 (ref 3.77–5.28)
TIBC SERPL-MCNC: 514 MCG/DL (ref 298–536)
TRANSFERRIN SERPL-MCNC: 345 MG/DL (ref 200–360)
WBC NRBC COR # BLD AUTO: 5.32 10*3/MM3 (ref 3.4–10.8)

## 2025-03-25 PROCEDURE — 85025 COMPLETE CBC W/AUTO DIFF WBC: CPT

## 2025-03-25 PROCEDURE — 84466 ASSAY OF TRANSFERRIN: CPT

## 2025-03-25 PROCEDURE — 82728 ASSAY OF FERRITIN: CPT

## 2025-03-25 PROCEDURE — 83540 ASSAY OF IRON: CPT

## 2025-03-25 PROCEDURE — 36415 COLL VENOUS BLD VENIPUNCTURE: CPT

## 2025-03-27 ENCOUNTER — OFFICE VISIT (OUTPATIENT)
Dept: FAMILY MEDICINE CLINIC | Facility: CLINIC | Age: 35
End: 2025-03-27
Payer: OTHER GOVERNMENT

## 2025-03-27 VITALS
DIASTOLIC BLOOD PRESSURE: 54 MMHG | HEIGHT: 66 IN | BODY MASS INDEX: 25.55 KG/M2 | WEIGHT: 159 LBS | HEART RATE: 58 BPM | OXYGEN SATURATION: 100 % | SYSTOLIC BLOOD PRESSURE: 111 MMHG

## 2025-03-27 DIAGNOSIS — L98.8 SKIN LESION OF BREAST: ICD-10-CM

## 2025-03-27 DIAGNOSIS — L81.9 ATYPICAL PIGMENTED SKIN LESION: Primary | ICD-10-CM

## 2025-03-27 PROCEDURE — 99213 OFFICE O/P EST LOW 20 MIN: CPT | Performed by: NURSE PRACTITIONER

## 2025-03-27 NOTE — PROGRESS NOTES
"Chief Complaint  Skin Lesion    SUBJECTIVE  Gisselle White presents to [facility] due to a brown spot on her left breast, pt states she noticed it about a week ago,states not sure how long it has been present, .      History of Present Illness  Past Medical History:   Diagnosis Date    Condition not found     HERNIA    Exudative tonsillitis 08/04/2024    Hemorrhoids       Family History   Problem Relation Age of Onset    Breast cancer Mother     Diabetes Mother     Heart disease Other       History reviewed. No pertinent surgical history.     Current Outpatient Medications:     ferrous gluconate (FERGON) 324 MG tablet, Take 1 tablet by mouth 2 (Two) Times a Day., Disp: 60 tablet, Rfl: 2    Current Facility-Administered Medications:     Paragard Intrauterine Copper IUD, , Intrauterine, Once, Kadeem Lo, APRN    OBJECTIVE  Vital Signs:   /54   Pulse 58   Ht 167.6 cm (66\")   Wt 72.1 kg (159 lb)   SpO2 100%   BMI 25.66 kg/m²    Estimated body mass index is 25.66 kg/m² as calculated from the following:    Height as of this encounter: 167.6 cm (66\").    Weight as of this encounter: 72.1 kg (159 lb).     Wt Readings from Last 3 Encounters:   03/27/25 72.1 kg (159 lb)   01/28/25 70.8 kg (156 lb)   12/12/24 72.7 kg (160 lb 3.2 oz)     BP Readings from Last 3 Encounters:   03/27/25 111/54   01/28/25 123/77   12/12/24 124/72       Physical Exam  Chest:          Comments: Approximately 0.5 x 1 cm slightly raised light brown pigmented skin lesion noted to upper outer left breast.  No surrounding erythema, skin noted to be slightly peeling at edges of lesion    Bilateral breast exam completed, no palpable masses, no other abnormalities         Result Review        XR Abdomen KUB  Result Date: 1/28/2025  Impression: 1.IUD device is noted within the pelvis. 2.Moderate stool burden which could reflect constipation. Electronically Signed: Messi Navarro MD  1/28/2025 1:07 PM EST  " Workstation ID: [id number]       The above data has been reviewed by MIGUEL Welsh 03/27/2025 09:26 EDT.          Patient Care Team:  Elaine Young APRN as PCP - General (Nurse Practitioner)         ASSESSMENT & PLAN    Diagnoses and all orders for this visit:    1. Atypical pigmented skin lesion (Primary)  -     Ambulatory Referral to Dermatology    2. Skin lesion of breast  -     Ambulatory Referral to Dermatology         Tobacco Use: Low Risk  (3/27/2025)    Patient History     Smoking Tobacco Use: Never     Smokeless Tobacco Use: Never     Passive Exposure: Never       Follow Up     Return if symptoms worsen or fail to improve.        Patient was given instructions and counseling regarding her condition or for health maintenance advice. Please see specific information pulled into the AVS if appropriate.   I have reviewed information obtained and documented by others and I have confirmed the accuracy of this documented note.    MIGUEL Welsh

## 2025-06-29 PROCEDURE — 87088 URINE BACTERIA CULTURE: CPT | Performed by: NURSE PRACTITIONER

## 2025-06-29 PROCEDURE — 87186 SC STD MICRODIL/AGAR DIL: CPT | Performed by: NURSE PRACTITIONER

## 2025-06-29 PROCEDURE — 87086 URINE CULTURE/COLONY COUNT: CPT | Performed by: NURSE PRACTITIONER
